# Patient Record
Sex: FEMALE | Race: WHITE | NOT HISPANIC OR LATINO | Employment: UNEMPLOYED | URBAN - NONMETROPOLITAN AREA
[De-identification: names, ages, dates, MRNs, and addresses within clinical notes are randomized per-mention and may not be internally consistent; named-entity substitution may affect disease eponyms.]

---
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
## 2024-01-21 ENCOUNTER — HOSPITAL ENCOUNTER (EMERGENCY)
Facility: HOSPITAL | Age: 19
Discharge: HOME | End: 2024-01-21
Attending: EMERGENCY MEDICINE | Admitting: EMERGENCY MEDICINE
Payer: COMMERCIAL

## 2024-01-21 VITALS
DIASTOLIC BLOOD PRESSURE: 81 MMHG | HEIGHT: 68 IN | OXYGEN SATURATION: 97 % | SYSTOLIC BLOOD PRESSURE: 139 MMHG | HEART RATE: 145 BPM | TEMPERATURE: 96.9 F | WEIGHT: 200.29 LBS | RESPIRATION RATE: 16 BRPM | BODY MASS INDEX: 30.36 KG/M2

## 2024-01-21 DIAGNOSIS — N39.0 URINARY TRACT INFECTION ASSOCIATED WITH CATHETERIZATION OF URINARY TRACT, UNSPECIFIED INDWELLING URINARY CATHETER TYPE, INITIAL ENCOUNTER (H): ICD-10-CM

## 2024-01-21 DIAGNOSIS — T83.511A URINARY TRACT INFECTION ASSOCIATED WITH CATHETERIZATION OF URINARY TRACT, UNSPECIFIED INDWELLING URINARY CATHETER TYPE, INITIAL ENCOUNTER (H): ICD-10-CM

## 2024-01-21 LAB
ALBUMIN UR-MCNC: NEGATIVE MG/DL
APPEARANCE UR: CLEAR
BACTERIA #/AREA URNS HPF: ABNORMAL /HPF
BILIRUB UR QL STRIP: NEGATIVE
COLOR UR AUTO: ABNORMAL
GLUCOSE UR STRIP-MCNC: NEGATIVE MG/DL
HGB UR QL STRIP: NEGATIVE
KETONES UR STRIP-MCNC: NEGATIVE MG/DL
LEUKOCYTE ESTERASE UR QL STRIP: NEGATIVE
NITRATE UR QL: NEGATIVE
PH UR STRIP: 6 [PH] (ref 4.7–8)
RBC URINE: 0 /HPF
SP GR UR STRIP: 1 (ref 1–1.03)
SQUAMOUS EPITHELIAL: 0 /HPF
UROBILINOGEN UR STRIP-MCNC: NORMAL MG/DL
WBC URINE: 1 /HPF

## 2024-01-21 PROCEDURE — 99283 EMERGENCY DEPT VISIT LOW MDM: CPT

## 2024-01-21 PROCEDURE — 81001 URINALYSIS AUTO W/SCOPE: CPT | Performed by: EMERGENCY MEDICINE

## 2024-01-21 PROCEDURE — 87086 URINE CULTURE/COLONY COUNT: CPT | Performed by: EMERGENCY MEDICINE

## 2024-01-21 PROCEDURE — 99283 EMERGENCY DEPT VISIT LOW MDM: CPT | Performed by: EMERGENCY MEDICINE

## 2024-01-21 RX ORDER — CEFDINIR 300 MG/1
300 CAPSULE ORAL 2 TIMES DAILY
Qty: 14 CAPSULE | Refills: 0 | Status: SHIPPED | OUTPATIENT
Start: 2024-01-21 | End: 2024-01-28

## 2024-01-21 NOTE — ED TRIAGE NOTES
Reports to having panic attack\, hx of anxiety. Reports she gets them often.  Reports left flank pain onset last evening. Unknown of fever, no bloody urine but reports its cloudy, denies dysuria.   Reports she is constipated, last BM was a few days ago, per pt not un normal to be constipated since she reports hx of it.

## 2024-01-21 NOTE — ED PROVIDER NOTES
History     Chief Complaint   Patient presents with    Anxiety    Flank Pain           Constipation    UTI     HPI  Rylee J Huttel is a 18 year old female who presents with concern for UTI.  She has dysuria of the left leg, history thereof, no other associated symptoms.  No severe pain, duration ongoing.  Character persistent.        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 See scanned visit from [facility] - 2015.  CTSS services, therapy in school      Bilateral vesicoureteral reflux 2011     Priority: Medium     Formatting of this note might be different from the original. [facility] IMPRESSION: 1. Grade IV left vesicoureteral reflux. 2. Grade III right vesicoureteral reflux. 3. Moderate post void residual in the bladder. IMO Update 10/11      Urinary incontinence 2010     Priority: Medium     Formatting of this note might be different from the original. Urology visit 16 - behavioral modifications, habit toileting.  If no improvement in 2 months - mom is to contact urology and will consider oxybutynin.  Also with constipation - recommended miralax cleanout, then maintainence      Exotropia 2009     Priority: Medium     Formatting of this note might be different from the original. IMO Update      Astigmatism 2009     Priority: Medium     Formatting of this note might be different from the original. IMO Update      Myopia 2009     Priority: Medium        Past Medical History:    No past medical history on file.    Past Surgical History:    No past surgical history on file.    Family History:    No family history on file.    Social  "History:  Marital Status:  Single [1]  Social History     Tobacco Use    Smoking status: Former     Types: Cigarettes    Smokeless tobacco: Never   Substance Use Topics    Alcohol use: Not Currently    Drug use: Not Currently        Medications:    cefdinir (OMNICEF) 300 MG capsule  cephALEXin (KEFLEX) 500 MG capsule  hydrOXYzine (ATARAX) 25 MG tablet  polyethylene glycol (MIRALAX) 17 GM/Dose powder  senna (SENOKOT) 8.6 MG tablet          Review of Systems  Respiratory denies.  Cardiovascular denies.  GI denies.   Per HPI.    Physical Exam   BP: 139/81  Pulse: (!) 145  Temp: 96.9  F (36.1  C)  Resp: 16  Height: 172.7 cm (5' 8\")  Weight: 90.9 kg (200 lb 4.6 oz)  SpO2: 97 %      Physical Exam  HENT:      Head: Normocephalic and atraumatic.      Nose: Nose normal.      Mouth/Throat:      Mouth: Mucous membranes are moist.   Eyes:      Conjunctiva/sclera: Conjunctivae normal.      Pupils: Pupils are equal, round, and reactive to light.   Cardiovascular:      Rate and Rhythm: Normal rate.      Pulses: Normal pulses.   Pulmonary:      Effort: Respiratory distress present.   Abdominal:      General: There is no distension.      Palpations: Abdomen is soft.      Tenderness: There is no abdominal tenderness.   Musculoskeletal:         General: Normal range of motion.      Cervical back: Normal range of motion.   Skin:     General: Skin is warm and dry.      Capillary Refill: Capillary refill takes less than 2 seconds.   Neurological:      General: No focal deficit present.      Mental Status: She is alert and oriented to person, place, and time.   Psychiatric:         Mood and Affect: Mood normal.              Results for orders placed or performed during the hospital encounter of 01/21/24 (from the past 24 hour(s))   UA with Microscopic reflex to Culture    Specimen: Urine, Midstream   Result Value Ref Range    Color Urine Straw Colorless, Straw, Light Yellow, Yellow    Appearance Urine Clear Clear    Glucose Urine " Negative Negative mg/dL    Bilirubin Urine Negative Negative    Ketones Urine Negative Negative mg/dL    Specific Gravity Urine 1.005 1.003 - 1.035    Blood Urine Negative Negative    pH Urine 6.0 4.7 - 8.0    Protein Albumin Urine Negative Negative mg/dL    Urobilinogen Urine Normal Normal, 2.0 mg/dL    Nitrite Urine Negative Negative    Leukocyte Esterase Urine Negative Negative    Bacteria Urine Few (A) None Seen /HPF    RBC Urine 0 <=2 /HPF    WBC Urine 1 <=5 /HPF    Squamous Epithelials Urine 0 <=1 /HPF    Narrative    Urine Culture not indicated       Medications - No data to display    Assessments & Plan (with Medical Decision Making)   89-year-old female with dysuria and left flank discomfort with mild nausea, urinalysis perhaps suggestive of UTI, she believes she has a UTI, plan for treatment for pyelonephritis as the patient know she has a history thereof.  Omnicef initiated.  She is absolute, she is not pregnant and family declines pregnancy test.  Abdominal examination benign and not warranting CT imaging.  No blood in urine and clinically no suggestion of renal colic.      Discharge Medication List as of 1/21/2024  1:47 PM        START taking these medications    Details   cefdinir (OMNICEF) 300 MG capsule Take 1 capsule (300 mg) by mouth 2 times daily for 7 days, Disp-14 capsule, R-0, E-Prescribe             Final diagnoses:   Urinary tract infection associated with catheterization of urinary tract, unspecified indwelling urinary catheter type, initial encounter  (H24)       1/21/2024   [facility]       Gilbert Hansen MD  01/21/24 8891

## 2024-01-22 LAB — BACTERIA UR CULT: NORMAL

## 2024-03-18 ENCOUNTER — APPOINTMENT (OUTPATIENT)
Dept: ULTRASOUND IMAGING | Facility: HOSPITAL | Age: 19
End: 2024-03-18
Attending: NURSE PRACTITIONER
Payer: MEDICAID

## 2024-03-18 ENCOUNTER — HOSPITAL ENCOUNTER (EMERGENCY)
Facility: HOSPITAL | Age: 19
Discharge: HOME | End: 2024-03-18
Attending: NURSE PRACTITIONER | Admitting: NURSE PRACTITIONER
Payer: MEDICAID

## 2024-03-18 VITALS
OXYGEN SATURATION: 98 % | TEMPERATURE: 97.2 F | BODY MASS INDEX: 27.22 KG/M2 | SYSTOLIC BLOOD PRESSURE: 130 MMHG | HEART RATE: 97 BPM | RESPIRATION RATE: 18 BRPM | WEIGHT: 179.6 LBS | DIASTOLIC BLOOD PRESSURE: 56 MMHG | HEIGHT: 68 IN

## 2024-03-18 DIAGNOSIS — R10.13 EPIGASTRIC ABDOMINAL PAIN: ICD-10-CM

## 2024-03-18 DIAGNOSIS — R10.13 DYSPEPSIA: ICD-10-CM

## 2024-03-18 LAB
ALBUMIN SERPL BCG-MCNC: 4.5 G/DL (ref 3.5–5.2)
ALBUMIN UR-MCNC: NEGATIVE MG/DL
ALP SERPL-CCNC: 65 U/L (ref 40–150)
ALT SERPL W P-5'-P-CCNC: 22 U/L (ref 0–50)
ANION GAP SERPL CALCULATED.3IONS-SCNC: 12 MMOL/L (ref 7–15)
APPEARANCE UR: CLEAR
AST SERPL W P-5'-P-CCNC: 38 U/L (ref 0–35)
BASOPHILS # BLD AUTO: 0 10E3/UL (ref 0–0.2)
BASOPHILS NFR BLD AUTO: 0 %
BILIRUB SERPL-MCNC: 0.6 MG/DL
BILIRUB UR QL STRIP: NEGATIVE
BUN SERPL-MCNC: 7 MG/DL (ref 6–20)
CALCIUM SERPL-MCNC: 9.5 MG/DL (ref 8.6–10)
CHLORIDE SERPL-SCNC: 104 MMOL/L (ref 98–107)
COLOR UR AUTO: ABNORMAL
CREAT SERPL-MCNC: 0.75 MG/DL (ref 0.51–0.95)
DEPRECATED HCO3 PLAS-SCNC: 23 MMOL/L (ref 22–29)
EGFRCR SERPLBLD CKD-EPI 2021: >90 ML/MIN/1.73M2
EOSINOPHIL # BLD AUTO: 0 10E3/UL (ref 0–0.7)
EOSINOPHIL NFR BLD AUTO: 0 %
ERYTHROCYTE [DISTWIDTH] IN BLOOD BY AUTOMATED COUNT: 14.8 % (ref 10–15)
GLUCOSE SERPL-MCNC: 91 MG/DL (ref 70–99)
GLUCOSE UR STRIP-MCNC: NEGATIVE MG/DL
HCG UR QL: NEGATIVE
HCT VFR BLD AUTO: 39.9 % (ref 35–47)
HGB BLD-MCNC: 13.3 G/DL (ref 11.7–15.7)
HGB UR QL STRIP: NEGATIVE
HOLD SPECIMEN: NORMAL
IMM GRANULOCYTES # BLD: 0 10E3/UL
IMM GRANULOCYTES NFR BLD: 0 %
KETONES UR STRIP-MCNC: ABNORMAL MG/DL
LEUKOCYTE ESTERASE UR QL STRIP: ABNORMAL
LIPASE SERPL-CCNC: 14 U/L (ref 13–60)
LYMPHOCYTES # BLD AUTO: 1.4 10E3/UL (ref 0.8–5.3)
LYMPHOCYTES NFR BLD AUTO: 21 %
MCH RBC QN AUTO: 28.1 PG (ref 26.5–33)
MCHC RBC AUTO-ENTMCNC: 33.3 G/DL (ref 31.5–36.5)
MCV RBC AUTO: 84 FL (ref 78–100)
MONOCYTES # BLD AUTO: 0.6 10E3/UL (ref 0–1.3)
MONOCYTES NFR BLD AUTO: 8 %
MUCOUS THREADS #/AREA URNS LPF: PRESENT /LPF
NEUTROPHILS # BLD AUTO: 4.9 10E3/UL (ref 1.6–8.3)
NEUTROPHILS NFR BLD AUTO: 70 %
NITRATE UR QL: NEGATIVE
NRBC # BLD AUTO: 0 10E3/UL
NRBC BLD AUTO-RTO: 0 /100
PH UR STRIP: 6 [PH] (ref 4.7–8)
PLATELET # BLD AUTO: 127 10E3/UL (ref 150–450)
POTASSIUM SERPL-SCNC: 3.2 MMOL/L (ref 3.4–5.3)
PROT SERPL-MCNC: 7.7 G/DL (ref 6.4–8.3)
RBC # BLD AUTO: 4.74 10E6/UL (ref 3.8–5.2)
RBC URINE: 1 /HPF
SODIUM SERPL-SCNC: 139 MMOL/L (ref 135–145)
SP GR UR STRIP: 1.01 (ref 1–1.03)
SQUAMOUS EPITHELIAL: 5 /HPF
UROBILINOGEN UR STRIP-MCNC: NORMAL MG/DL
WBC # BLD AUTO: 6.9 10E3/UL (ref 4–11)
WBC URINE: 2 /HPF

## 2024-03-18 PROCEDURE — 81001 URINALYSIS AUTO W/SCOPE: CPT | Performed by: FAMILY MEDICINE

## 2024-03-18 PROCEDURE — 83690 ASSAY OF LIPASE: CPT | Performed by: NURSE PRACTITIONER

## 2024-03-18 PROCEDURE — 250N000013 HC RX MED GY IP 250 OP 250 PS 637: Performed by: NURSE PRACTITIONER

## 2024-03-18 PROCEDURE — 81025 URINE PREGNANCY TEST: CPT | Performed by: NURSE PRACTITIONER

## 2024-03-18 PROCEDURE — 99283 EMERGENCY DEPT VISIT LOW MDM: CPT | Performed by: NURSE PRACTITIONER

## 2024-03-18 PROCEDURE — 99284 EMERGENCY DEPT VISIT MOD MDM: CPT | Mod: 25 | Performed by: NURSE PRACTITIONER

## 2024-03-18 PROCEDURE — 250N000009 HC RX 250: Performed by: NURSE PRACTITIONER

## 2024-03-18 PROCEDURE — 80053 COMPREHEN METABOLIC PANEL: CPT | Performed by: NURSE PRACTITIONER

## 2024-03-18 PROCEDURE — 81001 URINALYSIS AUTO W/SCOPE: CPT | Performed by: NURSE PRACTITIONER

## 2024-03-18 PROCEDURE — 87086 URINE CULTURE/COLONY COUNT: CPT | Performed by: NURSE PRACTITIONER

## 2024-03-18 PROCEDURE — 85025 COMPLETE CBC W/AUTO DIFF WBC: CPT | Performed by: NURSE PRACTITIONER

## 2024-03-18 PROCEDURE — 36415 COLL VENOUS BLD VENIPUNCTURE: CPT | Performed by: NURSE PRACTITIONER

## 2024-03-18 RX ORDER — LIDOCAINE HYDROCHLORIDE 20 MG/ML
10 SOLUTION OROPHARYNGEAL ONCE
Status: COMPLETED | OUTPATIENT
Start: 2024-03-18 | End: 2024-03-18

## 2024-03-18 RX ORDER — MAGNESIUM HYDROXIDE/ALUMINUM HYDROXICE/SIMETHICONE 120; 1200; 1200 MG/30ML; MG/30ML; MG/30ML
15 SUSPENSION ORAL ONCE
Status: COMPLETED | OUTPATIENT
Start: 2024-03-18 | End: 2024-03-18

## 2024-03-18 RX ADMIN — ALUMINUM HYDROXIDE, MAGNESIUM HYDROXIDE, AND DIMETHICONE 15 ML: 200; 20; 200 SUSPENSION ORAL at 19:41

## 2024-03-18 RX ADMIN — LIDOCAINE HYDROCHLORIDE 10 ML: 20 SOLUTION ORAL at 19:41

## 2024-03-18 ASSESSMENT — ENCOUNTER SYMPTOMS
MUSCULOSKELETAL NEGATIVE: 1
BLOOD IN STOOL: 1
ABDOMINAL PAIN: 1
PSYCHIATRIC NEGATIVE: 1
HEMATOLOGIC/LYMPHATIC NEGATIVE: 1
NEUROLOGICAL NEGATIVE: 1
VOMITING: 0
ALLERGIC/IMMUNOLOGIC NEGATIVE: 1
ENDOCRINE NEGATIVE: 1
FLANK PAIN: 0
CONSTIPATION: 1
CONSTITUTIONAL NEGATIVE: 1
NAUSEA: 0
DYSURIA: 1
CARDIOVASCULAR NEGATIVE: 1
RESPIRATORY NEGATIVE: 1
EYES NEGATIVE: 1
HEMATURIA: 0

## 2024-03-18 ASSESSMENT — ACTIVITIES OF DAILY LIVING (ADL)
ADLS_ACUITY_SCORE: 35
ADLS_ACUITY_SCORE: 35

## 2024-03-18 ASSESSMENT — COLUMBIA-SUICIDE SEVERITY RATING SCALE - C-SSRS
2. HAVE YOU ACTUALLY HAD ANY THOUGHTS OF KILLING YOURSELF IN THE PAST MONTH?: NO
1. IN THE PAST MONTH, HAVE YOU WISHED YOU WERE DEAD OR WISHED YOU COULD GO TO SLEEP AND NOT WAKE UP?: NO

## 2024-03-18 NOTE — ED TRIAGE NOTES
Patient presents with c/o stomach and back pain. Patient reports upper medial stomach pain which causes her back to start hurting and has been consistent over the last 24 hours. Patient reports last BM a couple days ago, reports she has to use laxatives to have a BM, reports not passing gas, reports dysuria, and bright red blood in stool.

## 2024-03-19 NOTE — ED PROVIDER NOTES
History     Chief Complaint   Patient presents with    Abdominal Pain     HPI  Rylee J Huttel is a 18 year old individual with history of adjustment disorder, comes in for abdominal pain.  Patient states that she developed epigastric pain last night and continues to have it today.  For this reason comes in.  No fever or chills.  No obvious nausea or vomiting.  States does have some burning with urination but no hematuria.  States had BM 2 days ago and did have some streaks of blood in it.  States does have chronic constipation.  Patient states there is no vaginal bleeding or discharge.  Last menstrual period ended the end of 2024.    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 See scanned visit from [facility] - 2015.  [facility] services, therapy in school      Bilateral vesicoureteral reflux 2011     Priority: Medium     Formatting of this note might be different from the original. [facility] IMPRESSION: 1. Grade IV left vesicoureteral reflux. 2. Grade III right vesicoureteral reflux. 3. Moderate post void residual in the bladder. IMO Update 10/11      Urinary incontinence 2010     Priority: Medium     Formatting of this note might be different from the original. Urology visit 16 - behavioral modifications, habit toileting.  If no improvement in 2 months - mom is to contact urology and will consider oxybutynin.  Also with constipation - recommended miralax cleanout, then maintainence      Exotropia 2009     Priority: Medium     Formatting of this note might be different from the original. IMO Update      Astigmatism 2009     Priority: Medium  "    Formatting of this note might be different from the original. IMO Update      Myopia 04/28/2009     Priority: Medium        Past Medical History:    History reviewed. No pertinent past medical history.    Past Surgical History:    History reviewed. No pertinent surgical history.    Family History:    History reviewed. No pertinent family history.    Social History:  Marital Status:  Single [1]  Social History     Tobacco Use    Smoking status: Former     Types: Cigarettes    Smokeless tobacco: Never   Substance Use Topics    Alcohol use: Not Currently    Drug use: Not Currently        Medications:    cephALEXin (KEFLEX) 500 MG capsule  hydrOXYzine (ATARAX) 25 MG tablet  polyethylene glycol (MIRALAX) 17 GM/Dose powder  senna (SENOKOT) 8.6 MG tablet          Review of Systems   Constitutional: Negative.    HENT: Negative.     Eyes: Negative.    Respiratory: Negative.     Cardiovascular: Negative.    Gastrointestinal:  Positive for abdominal pain (Epigastric), blood in stool and constipation. Negative for nausea and vomiting.   Endocrine: Negative.    Genitourinary:  Positive for dysuria. Negative for flank pain, hematuria, pelvic pain, vaginal bleeding and vaginal discharge.   Musculoskeletal: Negative.    Skin: Negative.    Allergic/Immunologic: Negative.    Neurological: Negative.    Hematological: Negative.    Psychiatric/Behavioral: Negative.         Physical Exam   BP: 138/83  Pulse: 90  Temp: 97.2  F (36.2  C)  Resp: 18  Height: 172.7 cm (5' 8\")  Weight: 81.5 kg (179 lb 9.6 oz)  SpO2: 99 %      GENERAL APPEARANCE:  The patient is a 18 year old well-developed, well-nourished individual in no acute distress that appears as stated age.  LUNGS:  Breathing is easy.  Breath sounds are equal and clear bilaterally.  No wheezes, rhonchi, or rales.  HEART:  Regular rate and rhythm with normal S1 and S2.  No murmurs, gallops, or rubs.  ABDOMEN:  Soft.  No mass, guarding, or rebound.  Right upper quadrant tenderness to " palpation.  Negative Rovsing.  No organomegaly or hernia.  Bowel sounds are present.  No CVA tenderness or flank mass.  No abdominal bruits or thrills present upon auscultation/palpation.  GENITOURINARY: No obvious anterior pelvic tenderness, hernia, mass noted to palpation.  NEUROLOGIC:  No focal sensory or motor deficits are noted.    PSYCHIATRIC:  The patient is awake, alert, and oriented x4.  Recent and remote memory is intact.  Extremely anxious mood and affect.  Cooperative with history and physical exam.  SKIN:  Warm, dry, and well perfused.  Good turgor.  No lesions, nodules, or rashes are noted.  No bruising noted.      Comment: Discrepancies between my note and notes on behalf of the nursing team or other care providers are secondary to my findings reflecting my physical examination and questioning of the patient.  Any conflicting information provided is not in line with my examination of the patient.       ED Course     ED Course as of 03/18/24 2001   Mon Mar 18, 2024   1907 In to see patient and history/physical completed.    1907 UA with Microscopic reflex to Culture(!)  UA does have moderate leukocyte esterase but only 2 WBCs.  Does have 5 squamous epithelial cells making it likely contaminated.   1934 POCUS gallbladder ultrasound was conducted showing no obvious abnormality   1934 GI cocktail ordered due to epigastric pain.   1954 Patient had relief of symptoms after GI cocktail.  Patient extremely anxious.  Likely is having dyspepsia.  All other lab work is benign so we will discharge home.  No antibiotics will await urine culture.  OTC antacids recommended.  Follow-up with PCP.     POC US ABDOMEN LIMITED    Date/Time: 3/18/2024 7:34 PM    Performed by: Gilbert Live APRN CNP  Authorized by: Gilbert Live APRN CNP    Procedure details:     Indications: abdominal pain      Assessment for:  Gallstones    Hepatobiliary:  Visualized  Hepatobiliary findings:     Common bile duct:  Normal     Gallbladder wall:  Normal    Gallbladder stones: not identified      Intra-abdominal fluid: not identified      Sonographic Noble's sign: negative           Results for orders placed or performed during the hospital encounter of 03/18/24 (from the past 24 hour(s))   UA with Microscopic reflex to Culture    Specimen: Urine, Clean Catch   Result Value Ref Range    Color Urine Light Yellow Colorless, Straw, Light Yellow, Yellow    Appearance Urine Clear Clear    Glucose Urine Negative Negative mg/dL    Bilirubin Urine Negative Negative    Ketones Urine Trace (A) Negative mg/dL    Specific Gravity Urine 1.015 1.003 - 1.035    Blood Urine Negative Negative    pH Urine 6.0 4.7 - 8.0    Protein Albumin Urine Negative Negative mg/dL    Urobilinogen Urine Normal Normal, 2.0 mg/dL    Nitrite Urine Negative Negative    Leukocyte Esterase Urine Moderate (A) Negative    Mucus Urine Present (A) None Seen /LPF    RBC Urine 1 <=2 /HPF    WBC Urine 2 <=5 /HPF    Squamous Epithelials Urine 5 (H) <=1 /HPF    Narrative    Urine Culture ordered based on laboratory criteria   HCG qualitative urine   Result Value Ref Range    hCG Urine Qualitative Negative Negative   [facility] Draw    Narrative    The following orders were created for panel order [facility] Draw.  Procedure                               Abnormality         Status                     ---------                               -----------         ------                     Extra Blue Top Tube[[id number]]                              In process                 Extra Red Top Tube[[id number]]                               In process                 Extra Green Top (Lithium...[[id number]]                      In process                 Extra Purple Top Tube[[id number]]                            In process                 Extra Heparinized Syringe[[id number]]                        In process                   Please view results for these tests on the individual orders.   CBC with  platelets differential    Narrative    The following orders were created for panel order CBC with platelets differential.  Procedure                               Abnormality         Status                     ---------                               -----------         ------                     CBC with platelets and d...[[id number]]  Abnormal            Final result               RBC and Platelet Morphology[[id number]]                                                   Please view results for these tests on the individual orders.   Comprehensive metabolic panel   Result Value Ref Range    Sodium 139 135 - 145 mmol/L    Potassium 3.2 (L) 3.4 - 5.3 mmol/L    Carbon Dioxide (CO2) 23 22 - 29 mmol/L    Anion Gap 12 7 - 15 mmol/L    Urea Nitrogen 7.0 6.0 - 20.0 mg/dL    Creatinine 0.75 0.51 - 0.95 mg/dL    GFR Estimate >90 >60 mL/min/1.73m2    Calcium 9.5 8.6 - 10.0 mg/dL    Chloride 104 98 - 107 mmol/L    Glucose 91 70 - 99 mg/dL    Alkaline Phosphatase 65 40 - 150 U/L    AST 38 (H) 0 - 35 U/L    ALT 22 0 - 50 U/L    Protein Total 7.7 6.4 - 8.3 g/dL    Albumin 4.5 3.5 - 5.2 g/dL    Bilirubin Total 0.6 <=1.2 mg/dL   Lipase   Result Value Ref Range    Lipase 14 13 - 60 U/L   CBC with platelets and differential   Result Value Ref Range    WBC Count 6.9 4.0 - 11.0 10e3/uL    RBC Count 4.74 3.80 - 5.20 10e6/uL    Hemoglobin 13.3 11.7 - 15.7 g/dL    Hematocrit 39.9 35.0 - 47.0 %    MCV 84 78 - 100 fL    MCH 28.1 26.5 - 33.0 pg    MCHC 33.3 31.5 - 36.5 g/dL    RDW 14.8 10.0 - 15.0 %    Platelet Count 127 (L) 150 - 450 10e3/uL    % Neutrophils 70 %    % Lymphocytes 21 %    % Monocytes 8 %    % Eosinophils 0 %    % Basophils 0 %    % Immature Granulocytes 0 %    NRBCs per 100 WBC 0 <1 /100    Absolute Neutrophils 4.9 1.6 - 8.3 10e3/uL    Absolute Lymphocytes 1.4 0.8 - 5.3 10e3/uL    Absolute Monocytes 0.6 0.0 - 1.3 10e3/uL    Absolute Eosinophils 0.0 0.0 - 0.7 10e3/uL    Absolute Basophils 0.0 0.0 - 0.2 10e3/uL    Absolute  Immature Granulocytes 0.0 <=0.4 10e3/uL    Absolute NRBCs 0.0 10e3/uL       Medications   alum & mag hydroxide-simethicone (MAALOX) suspension 15 mL (15 mLs Oral $Given 3/18/24 1941)   lidocaine (viscous) (XYLOCAINE) 2 % solution 10 mL (10 mLs Mouth/Throat $Given 3/18/24 1941)       Assessments & Plan (with Medical Decision Making)     I have reviewed the nursing notes.    I have reviewed the findings, diagnosis, plan and need for follow up with the patient.      Summary:  Patient presents to the ER today for abdominal pain.  Potential diagnosis which have been considered and evaluated include GERD, cholecystitis, pancreatitis, appendicitis, as well as others. Many of these have been excluded using the various modalities and assessment as noted on the chart. At the present time, the diagnosis given seems to be the most likely epigastric pain likely from dyspepsia.  Upon arrival, vitals signs are normal.  The patient is alert but extremely anxious upon arrival.  Cardiac and respiratory examination normal.  Minimal right upper quadrant tenderness to palpation.  No hernia or mass.  No CVA tenderness.  POCUS gallbladder ultrasound was conducted showing no obvious abnormalities.  Lab work obtained showing WBC of 6.9 with hemoglobin 13.3.  Sodium 139 with potassium 3.2.  Renal and hepatic functions otherwise benign.  Lipase normal at 14.  Pregnancy negative.  Does have moderate leukocyte esterase in the urine but 5 squamous epithelial cells with only 2 WBC's.  GI cocktail given with resolution of symptoms.  Likely is having dyspepsia.  Lab work is otherwise benign at this time.  UA likely contaminated.  No antibiotics until urine culture returns.  Will discharge patient home to do OTC heart medication such as Tums or famotidine.  Good hydration therapy education given.  Follow-up with PCP for reevaluation.  Return if worsening.  Patient verbalized understand this plan of care.  Patient discharged home.        Critical  Care Time: None    Impression and plan discussed with patient. Questions answered, concerns addressed, indications for urgent re-evaluation reviewed, and  given. Patient/Parent/Caregiver agree with treatment plan and have no further questions at this time.  AVS provided at discharge.    This note was created by the Dragon Voice Dictation System. Inadvertent typographical errors, due to software recognition problems, may still exist.             New Prescriptions    No medications on file       Final diagnoses:   Epigastric abdominal pain   Dyspepsia       3/18/2024   [facility]       Gilbert Live APRN CNP  03/18/24 2001

## 2024-03-19 NOTE — ED NOTES
Was diagnosed with a UTI about a month ago but did not  prescription d/t financial reasons.  Here with ABD and and flank pain. Has lost more than 50 lbs recently intentionally.  Did make a statement about eating too much food causing an anxiety attack.  Provider made aware.

## 2024-03-19 NOTE — DISCHARGE INSTRUCTIONS
Use over-the-counter meds such as Tums, famotidine for epigastric pain.    Keep well-hydrated.       Follow-up with your primary care provider for reevaluation.  Contact your primary care provider if you have any questions or concerns.  Do not hesitate to return to the ER if any new or worsening symptoms.     Please read the attached instructions (if any).  They highlight more specific treatments and interventions for you at home.              Thank you for letting me participate in your care and wish you a fast and uneventful recovery,    Gilbert MADRIGAL, CNP    Do not hesitate to contact me with questions or concerns.  [email or web address]  [email or web address]

## 2024-03-20 LAB — BACTERIA UR CULT: NORMAL

## 2024-03-24 ENCOUNTER — APPOINTMENT (OUTPATIENT)
Dept: CT IMAGING | Facility: HOSPITAL | Age: 19
End: 2024-03-24
Attending: STUDENT IN AN ORGANIZED HEALTH CARE EDUCATION/TRAINING PROGRAM
Payer: MEDICAID

## 2024-03-24 ENCOUNTER — HOSPITAL ENCOUNTER (EMERGENCY)
Facility: HOSPITAL | Age: 19
Discharge: LEFT BEFORE DISCHARGE | End: 2024-03-25
Attending: STUDENT IN AN ORGANIZED HEALTH CARE EDUCATION/TRAINING PROGRAM | Admitting: STUDENT IN AN ORGANIZED HEALTH CARE EDUCATION/TRAINING PROGRAM
Payer: MEDICAID

## 2024-03-24 DIAGNOSIS — K83.8 COMMON BILE DUCT DILATATION: ICD-10-CM

## 2024-03-24 LAB
ALBUMIN SERPL BCG-MCNC: 4.1 G/DL (ref 3.5–5.2)
ALBUMIN UR-MCNC: 10 MG/DL
ALP SERPL-CCNC: 78 U/L (ref 40–150)
ALT SERPL W P-5'-P-CCNC: 67 U/L (ref 0–50)
ANION GAP SERPL CALCULATED.3IONS-SCNC: 14 MMOL/L (ref 7–15)
APPEARANCE UR: CLEAR
AST SERPL W P-5'-P-CCNC: 89 U/L (ref 0–35)
BILIRUB DIRECT SERPL-MCNC: 0.62 MG/DL (ref 0–0.3)
BILIRUB SERPL-MCNC: 0.9 MG/DL
BILIRUB UR QL STRIP: NEGATIVE
BUN SERPL-MCNC: 9.1 MG/DL (ref 6–20)
CALCIUM SERPL-MCNC: 9.3 MG/DL (ref 8.6–10)
CHLORIDE SERPL-SCNC: 103 MMOL/L (ref 98–107)
COLOR UR AUTO: YELLOW
CREAT SERPL-MCNC: 0.81 MG/DL (ref 0.51–0.95)
DEPRECATED HCO3 PLAS-SCNC: 22 MMOL/L (ref 22–29)
EGFRCR SERPLBLD CKD-EPI 2021: >90 ML/MIN/1.73M2
ERYTHROCYTE [DISTWIDTH] IN BLOOD BY AUTOMATED COUNT: 14.2 % (ref 10–15)
GLUCOSE SERPL-MCNC: 157 MG/DL (ref 70–99)
GLUCOSE UR STRIP-MCNC: NEGATIVE MG/DL
HCG INTACT+B SERPL-ACNC: <1 MIU/ML
HCT VFR BLD AUTO: 36.2 % (ref 35–47)
HGB BLD-MCNC: 12 G/DL (ref 11.7–15.7)
HGB UR QL STRIP: NEGATIVE
KETONES UR STRIP-MCNC: ABNORMAL MG/DL
LEUKOCYTE ESTERASE UR QL STRIP: NEGATIVE
LIPASE SERPL-CCNC: 13 U/L (ref 13–60)
MCH RBC QN AUTO: 28.2 PG (ref 26.5–33)
MCHC RBC AUTO-ENTMCNC: 33.1 G/DL (ref 31.5–36.5)
MCV RBC AUTO: 85 FL (ref 78–100)
MUCOUS THREADS #/AREA URNS LPF: PRESENT /LPF
NITRATE UR QL: NEGATIVE
PH UR STRIP: 6 [PH] (ref 4.7–8)
PLATELET # BLD AUTO: 166 10E3/UL (ref 150–450)
POTASSIUM SERPL-SCNC: 3.3 MMOL/L (ref 3.4–5.3)
PROT SERPL-MCNC: 6.9 G/DL (ref 6.4–8.3)
RBC # BLD AUTO: 4.26 10E6/UL (ref 3.8–5.2)
RBC URINE: 0 /HPF
SODIUM SERPL-SCNC: 139 MMOL/L (ref 135–145)
SP GR UR STRIP: 1.01 (ref 1–1.03)
SQUAMOUS EPITHELIAL: 2 /HPF
TROPONIN T SERPL HS-MCNC: <6 NG/L
UROBILINOGEN UR STRIP-MCNC: 4 MG/DL
WBC # BLD AUTO: 9.5 10E3/UL (ref 4–11)
WBC URINE: 1 /HPF

## 2024-03-24 PROCEDURE — 84702 CHORIONIC GONADOTROPIN TEST: CPT | Performed by: STUDENT IN AN ORGANIZED HEALTH CARE EDUCATION/TRAINING PROGRAM

## 2024-03-24 PROCEDURE — 250N000011 HC RX IP 250 OP 636: Performed by: STUDENT IN AN ORGANIZED HEALTH CARE EDUCATION/TRAINING PROGRAM

## 2024-03-24 PROCEDURE — 81001 URINALYSIS AUTO W/SCOPE: CPT | Performed by: STUDENT IN AN ORGANIZED HEALTH CARE EDUCATION/TRAINING PROGRAM

## 2024-03-24 PROCEDURE — 96361 HYDRATE IV INFUSION ADD-ON: CPT

## 2024-03-24 PROCEDURE — 96374 THER/PROPH/DIAG INJ IV PUSH: CPT | Mod: XU

## 2024-03-24 PROCEDURE — 99285 EMERGENCY DEPT VISIT HI MDM: CPT | Mod: 25

## 2024-03-24 PROCEDURE — 93005 ELECTROCARDIOGRAM TRACING: CPT | Mod: RTG

## 2024-03-24 PROCEDURE — 258N000003 HC RX IP 258 OP 636: Performed by: STUDENT IN AN ORGANIZED HEALTH CARE EDUCATION/TRAINING PROGRAM

## 2024-03-24 PROCEDURE — 93010 ELECTROCARDIOGRAM REPORT: CPT | Performed by: INTERNAL MEDICINE

## 2024-03-24 PROCEDURE — 85027 COMPLETE CBC AUTOMATED: CPT | Performed by: STUDENT IN AN ORGANIZED HEALTH CARE EDUCATION/TRAINING PROGRAM

## 2024-03-24 PROCEDURE — 36415 COLL VENOUS BLD VENIPUNCTURE: CPT | Performed by: STUDENT IN AN ORGANIZED HEALTH CARE EDUCATION/TRAINING PROGRAM

## 2024-03-24 PROCEDURE — 80048 BASIC METABOLIC PNL TOTAL CA: CPT | Performed by: STUDENT IN AN ORGANIZED HEALTH CARE EDUCATION/TRAINING PROGRAM

## 2024-03-24 PROCEDURE — 83690 ASSAY OF LIPASE: CPT | Performed by: STUDENT IN AN ORGANIZED HEALTH CARE EDUCATION/TRAINING PROGRAM

## 2024-03-24 PROCEDURE — 84484 ASSAY OF TROPONIN QUANT: CPT | Performed by: STUDENT IN AN ORGANIZED HEALTH CARE EDUCATION/TRAINING PROGRAM

## 2024-03-24 PROCEDURE — 99285 EMERGENCY DEPT VISIT HI MDM: CPT | Performed by: STUDENT IN AN ORGANIZED HEALTH CARE EDUCATION/TRAINING PROGRAM

## 2024-03-24 PROCEDURE — 74177 CT ABD & PELVIS W/CONTRAST: CPT

## 2024-03-24 PROCEDURE — 80053 COMPREHEN METABOLIC PANEL: CPT | Performed by: STUDENT IN AN ORGANIZED HEALTH CARE EDUCATION/TRAINING PROGRAM

## 2024-03-24 RX ORDER — IOPAMIDOL 755 MG/ML
88 INJECTION, SOLUTION INTRAVASCULAR ONCE
Status: COMPLETED | OUTPATIENT
Start: 2024-03-24 | End: 2024-03-24

## 2024-03-24 RX ORDER — HYDROMORPHONE HYDROCHLORIDE 1 MG/ML
0.5 INJECTION, SOLUTION INTRAMUSCULAR; INTRAVENOUS; SUBCUTANEOUS EVERY 30 MIN PRN
Status: DISCONTINUED | OUTPATIENT
Start: 2024-03-24 | End: 2024-03-25 | Stop reason: HOSPADM

## 2024-03-24 RX ADMIN — IOPAMIDOL 88 ML: 755 INJECTION, SOLUTION INTRAVENOUS at 22:39

## 2024-03-24 RX ADMIN — SODIUM CHLORIDE 1000 ML: 9 INJECTION, SOLUTION INTRAVENOUS at 21:42

## 2024-03-24 RX ADMIN — HYDROMORPHONE HYDROCHLORIDE 0.5 MG: 1 INJECTION, SOLUTION INTRAMUSCULAR; INTRAVENOUS; SUBCUTANEOUS at 21:57

## 2024-03-24 ASSESSMENT — ACTIVITIES OF DAILY LIVING (ADL)
ADLS_ACUITY_SCORE: 35
ADLS_ACUITY_SCORE: 35

## 2024-03-24 ASSESSMENT — COLUMBIA-SUICIDE SEVERITY RATING SCALE - C-SSRS
2. HAVE YOU ACTUALLY HAD ANY THOUGHTS OF KILLING YOURSELF IN THE PAST MONTH?: NO
1. IN THE PAST MONTH, HAVE YOU WISHED YOU WERE DEAD OR WISHED YOU COULD GO TO SLEEP AND NOT WAKE UP?: NO
6. HAVE YOU EVER DONE ANYTHING, STARTED TO DO ANYTHING, OR PREPARED TO DO ANYTHING TO END YOUR LIFE?: NO

## 2024-03-25 VITALS
DIASTOLIC BLOOD PRESSURE: 87 MMHG | SYSTOLIC BLOOD PRESSURE: 141 MMHG | OXYGEN SATURATION: 100 % | HEART RATE: 115 BPM | TEMPERATURE: 98.7 F | RESPIRATION RATE: 12 BRPM

## 2024-03-25 LAB
ATRIAL RATE - MUSE: 48 BPM
DIASTOLIC BLOOD PRESSURE - MUSE: NORMAL MMHG
INTERPRETATION ECG - MUSE: NORMAL
P AXIS - MUSE: 49 DEGREES
PR INTERVAL - MUSE: 150 MS
QRS DURATION - MUSE: 90 MS
QT - MUSE: 492 MS
QTC - MUSE: 439 MS
R AXIS - MUSE: 82 DEGREES
SYSTOLIC BLOOD PRESSURE - MUSE: NORMAL MMHG
T AXIS - MUSE: 54 DEGREES
VENTRICULAR RATE- MUSE: 48 BPM

## 2024-03-25 RX ORDER — LORAZEPAM 2 MG/ML
1 INJECTION INTRAMUSCULAR ONCE
Status: DISCONTINUED | OUTPATIENT
Start: 2024-03-25 | End: 2024-03-25 | Stop reason: HOSPADM

## 2024-03-25 ASSESSMENT — ACTIVITIES OF DAILY LIVING (ADL)
ADLS_ACUITY_SCORE: 35

## 2024-03-25 NOTE — ED NOTES
EMS would be able to pick patient up in twenty minutes.  Updated patient and then she declined EMS ride again.  Canceled the EMS ride and she wants to sign AMA forms.  Cousin calling [employer] to pick them up. Provider made aware.

## 2024-03-25 NOTE — ED TRIAGE NOTES
"Patient ambulatory to ED room 2. Patient reports that she has had abdominal pain x2 months. Patient points to epigastrium. Patient states she was seen a couple days ago and was told she had heartburn, patient states \"this doesn't feel like heartburn\". Patient reports that she had a BM today and noted some blood \"when wiping\". Patient's coloring is pale.         "

## 2024-03-25 NOTE — ED PROVIDER NOTES
History     Chief Complaint   Patient presents with    Abdominal Pain     X2 months, worse today     HPI  Rylee J Huttel is a 18 year old female who presents with epigastric abdominal pain. She has had this intermittently for several months, but most severe started today. Epigastric in nature and radiates to chest and back. No hx of abdominal surgery. She has been told maybe it is reflux but feels much different than reflux. No current daily medications. Does have hydroxyzine as needed for anxiety. No N/V. Did have a bowel movement earlier today. Possibly saw a spot of blood with this. Period finished approximately 2-3 weeks ago. No fevers. No other acute concerns. No ETOH and no marijuana use.    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 See scanned visit from [facility] - 2015.  [facility]SS services, therapy in school      Bilateral vesicoureteral reflux 2011     Priority: Medium     Formatting of this note might be different from the original. [facility] IMPRESSION: 1. Grade IV left vesicoureteral reflux. 2. Grade III right vesicoureteral reflux. 3. Moderate post void residual in the bladder. IMO Update 10/11      Urinary incontinence 2010     Priority: Medium     Formatting of this note might be different from the original. Urology visit 16 - behavioral modifications, habit toileting.  If no improvement in 2 months - mom is to contact urology and will consider oxybutynin.  Also with constipation - recommended miralax cleanout, then maintainence      Exotropia 2009     Priority: Medium     Formatting of this note might be different from the original. IMO  Update      Astigmatism 04/28/2009     Priority: Medium     Formatting of this note might be different from the original. IMO Update      Myopia 04/28/2009     Priority: Medium        Past Medical History:    No past medical history on file.    Past Surgical History:    No past surgical history on file.    Family History:    No family history on file.    Social History:  Marital Status:  Single [1]  Social History     Tobacco Use    Smoking status: Former     Types: Cigarettes    Smokeless tobacco: Never   Substance Use Topics    Alcohol use: Not Currently    Drug use: Not Currently        Medications:    cephALEXin (KEFLEX) 500 MG capsule  hydrOXYzine (ATARAX) 25 MG tablet  polyethylene glycol (MIRALAX) 17 GM/Dose powder  senna (SENOKOT) 8.6 MG tablet      Review of Systems   All other systems reviewed and are negative.    Physical Exam   BP: (!) 83/54  Pulse: 55  Temp: (!) 96.2  F (35.7  C)  Resp: 16  SpO2: 99 %      Physical Exam  Vitals and nursing note reviewed.   Constitutional:       General: She is in acute distress.      Appearance: She is well-developed. She is not ill-appearing.   HENT:      Head: Normocephalic.   Eyes:      Extraocular Movements: Extraocular movements intact.   Cardiovascular:      Rate and Rhythm: Normal rate and regular rhythm.      Heart sounds: Normal heart sounds. No murmur heard.     No friction rub.   Pulmonary:      Effort: Pulmonary effort is normal.      Breath sounds: Normal breath sounds.   Abdominal:      General: Abdomen is flat. Bowel sounds are increased.      Palpations: Abdomen is rigid.      Tenderness: There is abdominal tenderness in the epigastric area. There is no guarding or rebound. Negative signs include Noble's sign.      Hernia: No hernia is present.   Skin:     General: Skin is warm and dry.      Capillary Refill: Capillary refill takes less than 2 seconds.   Neurological:      General: No focal deficit present.      Mental Status: She is alert.    Psychiatric:         Mood and Affect: Mood normal.         ED Course     ED Course as of 03/25/24 1911   Mon Mar 25, 2024   0002 Work-up shows biliary ductal dilitation and periportal edema. Given her epigastric/RUQ abdominal pain this could be consistent with choledocholithiais. We do not have MRCP or US or ERCP to evaluate at this time so will plan on transfer. Dr. Jesus at [place] accepts.     0659 Sign out given pending transfer to [place] for possible biliary obstruction needing MRCP/ERCP.   0701 SOR 18F with epigastric pain, periportale edema and biliary ductal distention. Pending transfer to [facility] transfer. Needs MRCP.   0733 Noted to speak with the patient.  She is sleeping in the room waiting for her ride.  I chose not to wake her at this time   1201 I did speak with the patient.  She again noted that she had no abdominal pain.  I expressed the importance of continuing to follow-up with the plan.  She was concerned that she might have to go to [place] alone that she is never been anywhere alone.  I offered to address her anxiety.  She agreed to take some Ativan prior to transfer and her mother lives in [place] so we agreed that her mother could come and visit her when she gets there.  Then while I was seeing other patients she decided that she was going to leave.  She left [place] before I had any opportunity to speak with her.     Procedures              Results for orders placed or performed during the hospital encounter of 03/24/24 (from the past 24 hour(s))   CBC with platelets   Result Value Ref Range    WBC Count 9.5 4.0 - 11.0 10e3/uL    RBC Count 4.26 3.80 - 5.20 10e6/uL    Hemoglobin 12.0 11.7 - 15.7 g/dL    Hematocrit 36.2 35.0 - 47.0 %    MCV 85 78 - 100 fL    MCH 28.2 26.5 - 33.0 pg    MCHC 33.1 31.5 - 36.5 g/dL    RDW 14.2 10.0 - 15.0 %    Platelet Count 166 150 - 450 10e3/uL   Basic metabolic panel   Result Value Ref Range    Sodium 139 135 - 145 mmol/L    Potassium 3.3 (L) 3.4 - 5.3  mmol/L    Chloride 103 98 - 107 mmol/L    Carbon Dioxide (CO2) 22 22 - 29 mmol/L    Anion Gap 14 7 - 15 mmol/L    Urea Nitrogen 9.1 6.0 - 20.0 mg/dL    Creatinine 0.81 0.51 - 0.95 mg/dL    GFR Estimate >90 >60 mL/min/1.73m2    Calcium 9.3 8.6 - 10.0 mg/dL    Glucose 157 (H) 70 - 99 mg/dL   Hepatic panel   Result Value Ref Range    Protein Total 6.9 6.4 - 8.3 g/dL    Albumin 4.1 3.5 - 5.2 g/dL    Bilirubin Total 0.9 <=1.2 mg/dL    Alkaline Phosphatase 78 40 - 150 U/L    AST 89 (H) 0 - 35 U/L    ALT 67 (H) 0 - 50 U/L    Bilirubin Direct 0.62 (H) 0.00 - 0.30 mg/dL   Lipase   Result Value Ref Range    Lipase 13 13 - 60 U/L   Troponin T, High Sensitivity   Result Value Ref Range    Troponin T, High Sensitivity <6 <=14 ng/L   HCG quantitative pregnancy (blood)   Result Value Ref Range    hCG Quantitative <1 <5 mIU/mL   EKG 12 lead   Result Value Ref Range    Systolic Blood Pressure  mmHg    Diastolic Blood Pressure  mmHg    Ventricular Rate 48 BPM    Atrial Rate 48 BPM    VA Interval 150 ms    QRS Duration 90 ms     ms    QTc 439 ms    P Axis 49 degrees    R AXIS 82 degrees    T Axis 54 degrees    Interpretation ECG       Sinus bradycardia  Otherwise normal ECG  When compared with ECG of 09-NOV-2023 13:27,  Vent. rate has decreased BY  75 BPM  T wave inversion no longer evident in Inferior leads  Nonspecific T wave abnormality now evident in Anterior leads  Confirmed by MD Jewell, [facility] Sandra ([id number]) on 3/25/2024 6:46:31 AM     CT Abdomen Pelvis w Contrast    Narrative    CT ABDOMEN PELVIS W CONTRAST    CLINICAL HISTORY: Female, age 18 years, Epigastric abdominal pain;    Comparison:  No relevant prior imaging.    TECHNIQUE:  CT scanwas performed of the abdomen and pelvis with IV  contrast. Axial; sagittal and coronal images were reviewed. If  present, MIP and/or 3-D images were constructed by the technologist.    FINDINGS:  Chest:   The lung bases and visualized portions of the heart  unremarkable.    Abdomen/Pelvis:  Stomach and duodenum: Unremarkable    Liver: Intrahepatic and axial hepatic biliary dilatation, periportal  edema. No focal lesion. There is slight decrease in density along the  falciform ligament.    Gallbladder: Unremarkable.    Spleen: Unremarkable.    Pancreas: Unremarkable.    Adrenal glands: Unremarkable.    Kidneys: Small cortical cyst and focus of cortical scarring in the  upper pole the left kidney.    Ureters: Unremarkable.    Bladder: Nondistended.    Large and small bowel: Moderate volume of stool in the distal and  proximal colon. No acute abnormality.    Appendix: Unremarkable.    Small volume of free fluid is seen within the lower pelvis.  Normal-appearing follicles are seen upon the left and right ovary.     Bony structures: Unremarkable.      Impression    IMPRESSION:   Intrahepatic and axial hepatic biliary dilatation without evidence of  a radiodense obstructing stone or apparent mass. Consider MRCP versus  ERCP to evaluate for an obstructive process.    Perceived wall thickening of the urinary bladder, at least partially  related to incomplete distention. Cannot completely exclude cystitis.  Small volume of free fluid in the lower pelvis suggesting recent  rupture of an ovarian follicle.     This report is in agreement with the preliminary report.      This facility minimizes radiation dose by adjusting the mA and/or kV  according to each patient size.      This CT scan was performed using one or more the following dose  reduction techniques:    -Automated exposure control,  -Adjustment of the mA and/or kV according to patient's size, and/or,  -Use of iterative reconstruction technique.    LINSEY BLUE MD         SYSTEM ID:  [id number]   UA with Microscopic reflex to Culture    Specimen: Urine, Midstream   Result Value Ref Range    Color Urine Yellow Colorless, Straw, Light Yellow, Yellow    Appearance Urine Clear Clear    Glucose Urine Negative Negative  mg/dL    Bilirubin Urine Negative Negative    Ketones Urine Trace (A) Negative mg/dL    Specific Gravity Urine 1.015 1.003 - 1.035    Blood Urine Negative Negative    pH Urine 6.0 4.7 - 8.0    Protein Albumin Urine 10 (A) Negative mg/dL    Urobilinogen Urine 4.0 (A) Normal, 2.0 mg/dL    Nitrite Urine Negative Negative    Leukocyte Esterase Urine Negative Negative    Mucus Urine Present (A) None Seen /LPF    RBC Urine 0 <=2 /HPF    WBC Urine 1 <=5 /HPF    Squamous Epithelials Urine 2 (H) <=1 /HPF    Narrative    Urine Culture not indicated       Medications   sodium chloride 0.9% BOLUS 1,000 mL (0 mLs Intravenous Stopped 3/24/24 2313)   iopamidol (ISOVUE-370) solution 88 mL (88 mLs Intravenous $Given 3/24/24 2239)   sodium chloride (PF) 0.9% PF flush 50 mL (50 mLs Intravenous $Given 3/24/24 2240)       Assessments & Plan (with Medical Decision Making)     I have reviewed the nursing notes.    Epigastric pain. Although she does have chronic abdominal pain, she appears uncomfortable and states this is different/worse. Plan on labs, pain control, CT scan. Do not suspect gynecologic etiology given no lower abdominal pain. No vaginal bleeding. Disposition pending work-up and pain control. Low suspicion for AAA given her age and risk factors.    See ED Coruse.    I have reviewed the findings, diagnosis, plan and need for follow up with the patient.      Discharge Medication List as of 3/25/2024  9:54 AM          Final diagnoses:   Common bile duct dilatation       3/24/2024   [facility]       Med Blanc MD  03/25/24 1911

## 2024-03-25 NOTE — ED NOTES
Called [facility] at [phone] to let them know the patient would no longer be transferred to them and they did not need to hold room 1412 anymore.    Partial Purse String (Simple) Text: Given the location of the defect and the characteristics of the surrounding skin a simple purse string closure was deemed most appropriate.  Undermining was performed circumfirentially around the surgical defect.  A purse string suture was then placed and tightened. Wound tension only allowed a partial closure of the circular defect.

## 2024-05-07 ENCOUNTER — HOSPITAL ENCOUNTER (EMERGENCY)
Facility: HOSPITAL | Age: 19
Discharge: LEFT BEFORE DISCHARGE | End: 2024-05-07
Payer: MEDICAID

## 2024-05-07 VITALS
DIASTOLIC BLOOD PRESSURE: 72 MMHG | RESPIRATION RATE: 18 BRPM | OXYGEN SATURATION: 100 % | WEIGHT: 167.77 LBS | BODY MASS INDEX: 25.51 KG/M2 | TEMPERATURE: 98 F | HEART RATE: 91 BPM | SYSTOLIC BLOOD PRESSURE: 108 MMHG

## 2024-05-08 ENCOUNTER — TELEPHONE (OUTPATIENT)
Dept: EMERGENCY MEDICINE | Facility: HOSPITAL | Age: 19
End: 2024-05-08

## 2024-05-08 NOTE — ED TRIAGE NOTES
States that about 1 hour ago developed upper abdominal pain. Ate about 1/2 hour before pain started. States was here about a month ago for similar pain and was suppose to go to [place], but refused because grandmother couldn't ride along in ambulance. Denies N/V. Last bowel movement was toady. Has not taken anything for pain.      Triage Assessment (Adult)       Row Name 05/07/24 2000          Triage Assessment    Airway WDL WDL

## 2024-05-08 NOTE — ED NOTES
Care Transitions focused note:      Follow up call on patient who left without being seen.    Pt came in with abd pain.  States she is feeling better today.  States she does not haver PCP.  Would like [place] [facility] care appt .      Appt with Dr Cory Davis on Wed June 5th at 9:15 am.      Called patient with this appt time.  Encouraged to return if symptoms return or worsen.    LIDIA Causey

## 2024-05-20 ENCOUNTER — APPOINTMENT (OUTPATIENT)
Dept: ULTRASOUND IMAGING | Facility: HOSPITAL | Age: 19
End: 2024-05-20
Attending: PHYSICIAN ASSISTANT
Payer: MEDICAID

## 2024-05-20 ENCOUNTER — HOSPITAL ENCOUNTER (EMERGENCY)
Facility: HOSPITAL | Age: 19
Discharge: HOME | End: 2024-05-20
Attending: PHYSICIAN ASSISTANT | Admitting: PHYSICIAN ASSISTANT
Payer: MEDICAID

## 2024-05-20 VITALS
OXYGEN SATURATION: 99 % | SYSTOLIC BLOOD PRESSURE: 135 MMHG | DIASTOLIC BLOOD PRESSURE: 69 MMHG | HEART RATE: 85 BPM | RESPIRATION RATE: 16 BRPM | TEMPERATURE: 98.1 F

## 2024-05-20 DIAGNOSIS — K80.20 SYMPTOMATIC CHOLELITHIASIS: Primary | ICD-10-CM

## 2024-05-20 LAB
ALBUMIN SERPL BCG-MCNC: 4.4 G/DL (ref 3.5–5.2)
ALBUMIN UR-MCNC: NEGATIVE MG/DL
ALP SERPL-CCNC: 68 U/L (ref 40–150)
ALT SERPL W P-5'-P-CCNC: 14 U/L (ref 0–50)
ANION GAP SERPL CALCULATED.3IONS-SCNC: 10 MMOL/L (ref 7–15)
APPEARANCE UR: CLEAR
AST SERPL W P-5'-P-CCNC: 16 U/L (ref 0–35)
BASOPHILS # BLD AUTO: 0 10E3/UL (ref 0–0.2)
BASOPHILS NFR BLD AUTO: 0 %
BILIRUB SERPL-MCNC: 0.4 MG/DL
BILIRUB UR QL STRIP: NEGATIVE
BUN SERPL-MCNC: 7 MG/DL (ref 6–20)
CALCIUM SERPL-MCNC: 9.5 MG/DL (ref 8.6–10)
CHLORIDE SERPL-SCNC: 105 MMOL/L (ref 98–107)
COLOR UR AUTO: ABNORMAL
CREAT SERPL-MCNC: 0.79 MG/DL (ref 0.51–0.95)
DEPRECATED HCO3 PLAS-SCNC: 24 MMOL/L (ref 22–29)
EGFRCR SERPLBLD CKD-EPI 2021: >90 ML/MIN/1.73M2
EOSINOPHIL # BLD AUTO: 0 10E3/UL (ref 0–0.7)
EOSINOPHIL NFR BLD AUTO: 0 %
ERYTHROCYTE [DISTWIDTH] IN BLOOD BY AUTOMATED COUNT: 13.5 % (ref 10–15)
GLUCOSE SERPL-MCNC: 99 MG/DL (ref 70–99)
GLUCOSE UR STRIP-MCNC: NEGATIVE MG/DL
HCG UR QL: NEGATIVE
HCT VFR BLD AUTO: 38.5 % (ref 35–47)
HGB BLD-MCNC: 12.8 G/DL (ref 11.7–15.7)
HGB UR QL STRIP: NEGATIVE
HOLD SPECIMEN: NORMAL
IMM GRANULOCYTES # BLD: 0 10E3/UL
IMM GRANULOCYTES NFR BLD: 0 %
KETONES UR STRIP-MCNC: NEGATIVE MG/DL
LEUKOCYTE ESTERASE UR QL STRIP: ABNORMAL
LIPASE SERPL-CCNC: 11 U/L (ref 13–60)
LYMPHOCYTES # BLD AUTO: 1.7 10E3/UL (ref 0.8–5.3)
LYMPHOCYTES NFR BLD AUTO: 26 %
MCH RBC QN AUTO: 28.4 PG (ref 26.5–33)
MCHC RBC AUTO-ENTMCNC: 33.2 G/DL (ref 31.5–36.5)
MCV RBC AUTO: 86 FL (ref 78–100)
MONOCYTES # BLD AUTO: 0.4 10E3/UL (ref 0–1.3)
MONOCYTES NFR BLD AUTO: 6 %
MUCOUS THREADS #/AREA URNS LPF: PRESENT /LPF
NEUTROPHILS # BLD AUTO: 4.3 10E3/UL (ref 1.6–8.3)
NEUTROPHILS NFR BLD AUTO: 68 %
NITRATE UR QL: NEGATIVE
NRBC # BLD AUTO: 0 10E3/UL
NRBC BLD AUTO-RTO: 0 /100
PH UR STRIP: 6.5 [PH] (ref 4.7–8)
PLATELET # BLD AUTO: 165 10E3/UL (ref 150–450)
POTASSIUM SERPL-SCNC: 3.7 MMOL/L (ref 3.4–5.3)
PROT SERPL-MCNC: 7.4 G/DL (ref 6.4–8.3)
RBC # BLD AUTO: 4.5 10E6/UL (ref 3.8–5.2)
RBC URINE: 1 /HPF
SODIUM SERPL-SCNC: 139 MMOL/L (ref 135–145)
SP GR UR STRIP: 1.01 (ref 1–1.03)
SQUAMOUS EPITHELIAL: 2 /HPF
UROBILINOGEN UR STRIP-MCNC: NORMAL MG/DL
WBC # BLD AUTO: 6.3 10E3/UL (ref 4–11)
WBC URINE: 2 /HPF

## 2024-05-20 PROCEDURE — 83690 ASSAY OF LIPASE: CPT | Performed by: PHYSICIAN ASSISTANT

## 2024-05-20 PROCEDURE — 81001 URINALYSIS AUTO W/SCOPE: CPT | Performed by: PHYSICIAN ASSISTANT

## 2024-05-20 PROCEDURE — 85025 COMPLETE CBC W/AUTO DIFF WBC: CPT | Performed by: PHYSICIAN ASSISTANT

## 2024-05-20 PROCEDURE — 36415 COLL VENOUS BLD VENIPUNCTURE: CPT | Performed by: PHYSICIAN ASSISTANT

## 2024-05-20 PROCEDURE — 99284 EMERGENCY DEPT VISIT MOD MDM: CPT | Mod: 25

## 2024-05-20 PROCEDURE — 76705 ECHO EXAM OF ABDOMEN: CPT

## 2024-05-20 PROCEDURE — 81025 URINE PREGNANCY TEST: CPT | Performed by: PHYSICIAN ASSISTANT

## 2024-05-20 PROCEDURE — 99284 EMERGENCY DEPT VISIT MOD MDM: CPT | Performed by: PHYSICIAN ASSISTANT

## 2024-05-20 PROCEDURE — 81001 URINALYSIS AUTO W/SCOPE: CPT | Performed by: STUDENT IN AN ORGANIZED HEALTH CARE EDUCATION/TRAINING PROGRAM

## 2024-05-20 PROCEDURE — 81025 URINE PREGNANCY TEST: CPT | Performed by: STUDENT IN AN ORGANIZED HEALTH CARE EDUCATION/TRAINING PROGRAM

## 2024-05-20 PROCEDURE — 82040 ASSAY OF SERUM ALBUMIN: CPT | Performed by: PHYSICIAN ASSISTANT

## 2024-05-20 ASSESSMENT — COLUMBIA-SUICIDE SEVERITY RATING SCALE - C-SSRS
6. HAVE YOU EVER DONE ANYTHING, STARTED TO DO ANYTHING, OR PREPARED TO DO ANYTHING TO END YOUR LIFE?: NO
2. HAVE YOU ACTUALLY HAD ANY THOUGHTS OF KILLING YOURSELF IN THE PAST MONTH?: NO
1. IN THE PAST MONTH, HAVE YOU WISHED YOU WERE DEAD OR WISHED YOU COULD GO TO SLEEP AND NOT WAKE UP?: NO

## 2024-05-20 ASSESSMENT — ACTIVITIES OF DAILY LIVING (ADL)
ADLS_ACUITY_SCORE: 33
ADLS_ACUITY_SCORE: 35

## 2024-05-20 ASSESSMENT — ENCOUNTER SYMPTOMS
FEVER: 0
ROS GI COMMENTS: SEE HPI

## 2024-05-20 NOTE — ED TRIAGE NOTES
Patient presents with c/o intermittent epigastric abdominal pain for a couple months. Patient reports that she has been evaluated for this pain in the past, she thinks it may be something with her gallbladder. Patient also reports feeling weak and pain is radiating from medial epigastric pain into left side under ribs. Denies taking anything for the pain, denies any fevers or chills. Denies any active epigastric pain at this time.

## 2024-05-20 NOTE — ED PROVIDER NOTES
History     Chief Complaint   Patient presents with    Abdominal Pain     The history is provided by the patient.     Rylee J Huttel is a 18 year old female who presented to the emergency department ambulatory for evaluation of persistent but intermittent epigastric pain.  Seen in March for similar and found to have mildly abnormal transaminases and a dilated biliary duct.  She elected to sign out AGAINST MEDICAL ADVICE after being offered transfer for evaluation.  Worsening with daily.     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 See scanned visit from [facility] - 2015.  [facility] services, therapy in school      Bilateral vesicoureteral reflux 2011     Priority: Medium     Formatting of this note might be different from the original. [facility] IMPRESSION: 1. Grade IV left vesicoureteral reflux. 2. Grade III right vesicoureteral reflux. 3. Moderate post void residual in the bladder. IMO Update 10/11      Urinary incontinence 2010     Priority: Medium     Formatting of this note might be different from the original. Urology visit 16 - behavioral modifications, habit toileting.  If no improvement in 2 months - mom is to contact urology and will consider oxybutynin.  Also with constipation - recommended miralax cleanout, then maintainence      Exotropia 2009     Priority: Medium     Formatting of this note might be different from the original. IMO Update      Astigmatism 2009     Priority: Medium     Formatting of this note might be different from the original. IMO Update      Myopia 2009     Priority: Medium        Past Medical History:     History reviewed. No pertinent past medical history.    Past Surgical History:    History reviewed. No pertinent surgical history.    Family History:    History reviewed. No pertinent family history.    Social History:  Marital Status:  Single [1]  Social History     Tobacco Use    Smoking status: Former     Types: Cigarettes    Smokeless tobacco: Never   Substance Use Topics    Alcohol use: Not Currently    Drug use: Not Currently        Medications:    cephALEXin (KEFLEX) 500 MG capsule  hydrOXYzine (ATARAX) 25 MG tablet  polyethylene glycol (MIRALAX) 17 GM/Dose powder  senna (SENOKOT) 8.6 MG tablet          Review of Systems   Constitutional:  Negative for fever.   Gastrointestinal:         See HPI       Physical Exam   BP: 125/82  Pulse: 83  Temp: 98  F (36.7  C)  Resp: 16  SpO2: 100 %      Physical Exam  Vitals and nursing note reviewed.   Constitutional:       General: She is not in acute distress.     Appearance: Normal appearance. She is normal weight. She is not ill-appearing, toxic-appearing or diaphoretic.   Cardiovascular:      Rate and Rhythm: Normal rate and regular rhythm.   Pulmonary:      Effort: Pulmonary effort is normal.   Abdominal:      General: There is no distension.      Palpations: Abdomen is soft.      Tenderness: There is no abdominal tenderness.   Skin:     General: Skin is warm and dry.      Capillary Refill: Capillary refill takes less than 2 seconds.   Neurological:      General: No focal deficit present.      Mental Status: She is alert and oriented to person, place, and time.         ED Course     ED Course as of 05/20/24 1945   Mon May 20, 2024   1856 Bilirubin Total: 0.4   1856 AST: 16   1856 ALT: 14   1856 Lipase(!): 11     Procedures              Critical Care time:  none               Results for orders placed or performed during the hospital encounter of 05/20/24 (from the past 24 hour(s))   UA with Microscopic reflex to Culture    Specimen: Urine, Clean Catch   Result Value  Ref Range    Color Urine Straw Colorless, Straw, Light Yellow, Yellow    Appearance Urine Clear Clear    Glucose Urine Negative Negative mg/dL    Bilirubin Urine Negative Negative    Ketones Urine Negative Negative mg/dL    Specific Gravity Urine 1.007 1.003 - 1.035    Blood Urine Negative Negative    pH Urine 6.5 4.7 - 8.0    Protein Albumin Urine Negative Negative mg/dL    Urobilinogen Urine Normal Normal, 2.0 mg/dL    Nitrite Urine Negative Negative    Leukocyte Esterase Urine Small (A) Negative    Mucus Urine Present (A) None Seen /LPF    RBC Urine 1 <=2 /HPF    WBC Urine 2 <=5 /HPF    Squamous Epithelials Urine 2 (H) <=1 /HPF    Narrative    Urine Culture not indicated   HCG qualitative urine   Result Value Ref Range    hCG Urine Qualitative Negative Negative   CBC with platelets differential    Narrative    The following orders were created for panel order CBC with platelets differential.  Procedure                               Abnormality         Status                     ---------                               -----------         ------                     CBC with platelets and d...[[id number]]                      Final result                 Please view results for these tests on the individual orders.   Comprehensive metabolic panel   Result Value Ref Range    Sodium 139 135 - 145 mmol/L    Potassium 3.7 3.4 - 5.3 mmol/L    Carbon Dioxide (CO2) 24 22 - 29 mmol/L    Anion Gap 10 7 - 15 mmol/L    Urea Nitrogen 7.0 6.0 - 20.0 mg/dL    Creatinine 0.79 0.51 - 0.95 mg/dL    GFR Estimate >90 >60 mL/min/1.73m2    Calcium 9.5 8.6 - 10.0 mg/dL    Chloride 105 98 - 107 mmol/L    Glucose 99 70 - 99 mg/dL    Alkaline Phosphatase 68 40 - 150 U/L    AST 16 0 - 35 U/L    ALT 14 0 - 50 U/L    Protein Total 7.4 6.4 - 8.3 g/dL    Albumin 4.4 3.5 - 5.2 g/dL    Bilirubin Total 0.4 <=1.2 mg/dL   Lipase   Result Value Ref Range    Lipase 11 (L) 13 - 60 U/L   Extra Tube    Narrative    The following orders were created for  panel order Extra Tube.  Procedure                               Abnormality         Status                     ---------                               -----------         ------                     Extra Blue Top Tube[[id number]]                              Final result               Extra Red Top Tube[[id number]]                               Final result               Extra Heparinized Syringe[[id number]]                        Final result                 Please view results for these tests on the individual orders.   Extra Blue Top Tube   Result Value Ref Range    Hold Specimen JIC    Extra Red Top Tube   Result Value Ref Range    Hold Specimen JIC    Extra Heparinized Syringe   Result Value Ref Range    Hold Specimen JIC    CBC with platelets and differential   Result Value Ref Range    WBC Count 6.3 4.0 - 11.0 10e3/uL    RBC Count 4.50 3.80 - 5.20 10e6/uL    Hemoglobin 12.8 11.7 - 15.7 g/dL    Hematocrit 38.5 35.0 - 47.0 %    MCV 86 78 - 100 fL    MCH 28.4 26.5 - 33.0 pg    MCHC 33.2 31.5 - 36.5 g/dL    RDW 13.5 10.0 - 15.0 %    Platelet Count 165 150 - 450 10e3/uL    % Neutrophils 68 %    % Lymphocytes 26 %    % Monocytes 6 %    % Eosinophils 0 %    % Basophils 0 %    % Immature Granulocytes 0 %    NRBCs per 100 WBC 0 <1 /100    Absolute Neutrophils 4.3 1.6 - 8.3 10e3/uL    Absolute Lymphocytes 1.7 0.8 - 5.3 10e3/uL    Absolute Monocytes 0.4 0.0 - 1.3 10e3/uL    Absolute Eosinophils 0.0 0.0 - 0.7 10e3/uL    Absolute Basophils 0.0 0.0 - 0.2 10e3/uL    Absolute Immature Granulocytes 0.0 <=0.4 10e3/uL    Absolute NRBCs 0.0 10e3/uL   US Abdomen Limited    Narrative    EXAMINATION: Limited Abdominal Ultrasound, 5/20/2024 7:15 PM     COMPARISON: None.    HISTORY: The epigastric and upper quadrant pain    TECHNIQUE: Focused right upper quadrant multiplanar scan of the  abdomen using both gray-scale, color Doppler ultrasound techniques.    FINDINGS:   Fluid: No evidence of ascites or pleural effusions.    Liver: The  liver is normal in size and echotexture. There is no focal  mass. The main portal vein is nondilated and demonstrates appropriate  flow towards the liver.    Gallbladder: There is no wall thickening, pericholecystic fluid,  positive sonographic Noble's sign. Echogenic shadowing stones near  the neck.    Bile Ducts: Both the intra- and extrahepatic biliary system are of  normal caliber.  The common bile duct measures 5 mm in diameter.    Pancreas: Visualized portions of the head and body of the pancreas are  unremarkable.     Right kidney: The right kidney measures 10.1 cm long. There is no  hydronephrosis or hydroureter, no shadowing renal calculi or soft  tissue mass.     Vessels: The visualized IVC and aorta are within normal limits      Impression    IMPRESSION:   1.  No acute right upper quadrant abnormality.  2.  Cholelithiasis without cholecystitis.    RIZWANA BILLS MD         SYSTEM ID:  [id number]       Medications - No data to display    Assessments & Plan (with Medical Decision Making)   18-year-old female with intermittent epigastric pain worse with some foods.  Her recent emergency department evaluation in March showed mild elevations of transaminases as well as bilirubin.  Mild dilation of the CBD.  She unfortunately like to sign out AGAINST MEDICAL ADVICE before definitive therapy.  Symptoms are most consistent biliary colic.  She has no high risk or red flag features.  She has no vomiting or fevers.  She has no active discomfort.  Ultrasound of the right upper quadrant shows cholelithiasis with normal CBD and no evidence of acute cholecystitis.  She has normal transaminases and bilirubin today.  General surgery referral was placed.  Strict return precautions were provided.  Low-fat diet discussed.  She has an establish care visit in June.    Ms. Rivero has also agreed to schedule a follow up appointment with her primary provider clinic and general surgery for re-evaluation and further management. She  verbalized an understanding of the results of our workup and agree with the complete discharge plan including instructions for return and follow up.  There is no indication for further investigation or treatment on an emergent basis.  There is no reasonably foreseeable injury that would be associated with discharge and close outpatient follow-up.      This document was prepared using a combination of typing and voice generated software.  While every attempt was made for accuracy, spelling and grammatical errors may exist.     I have reviewed the nursing notes.    I have reviewed the findings, diagnosis, plan and need for follow up with the patient.           Medical Decision Making  The patient's presentation was of moderate complexity (an undiagnosed new problem with uncertain diagnosis).    The patient's evaluation involved:  ordering and/or review of 3+ test(s) in this encounter (multiple labs and ultrasound) review of previous emergency department labs    The patient's management necessitated only low risk treatment.        New Prescriptions    No medications on file       Final diagnoses:   Symptomatic cholelithiasis       5/20/2024   [facility]       Dee Grewal PA-C  05/20/24 1950

## 2024-05-20 NOTE — ED NOTES
Pt has lower abdominal pain that radiates to left upper quadrant under rib. Pt states this has been intermittent for the last couple months. Pt denies nausea,vomiting, & SOB. Pt does state she is constipated but this is normal for her. Pt states her last BM was today and was small. Pt states she doesn't drink enough fluids. Pt is also concerned about various black and blue marks on bilateral lower legs that she doesn't know what are from. She doesn't recall injuring them. She states they are painful if touched.

## 2024-05-21 NOTE — DISCHARGE INSTRUCTIONS
Your abdominal pain is likely secondary to gallstones.  I have placed a general surgery referral to discuss possible surgical treatment.  Continue a low-fat diet.  Ibuprofen and Tylenol as needed.  Return here for any other questions or concerns.    Please make sure you follow-up with Dr. Davis as scheduled on June 5.

## 2024-05-29 ENCOUNTER — PREP FOR PROCEDURE (OUTPATIENT)
Dept: SURGERY | Facility: OTHER | Age: 19
End: 2024-05-29

## 2024-05-29 ENCOUNTER — OFFICE VISIT (OUTPATIENT)
Dept: SURGERY | Facility: OTHER | Age: 19
End: 2024-05-29
Attending: SURGERY
Payer: MEDICAID

## 2024-05-29 VITALS
BODY MASS INDEX: 25.58 KG/M2 | DIASTOLIC BLOOD PRESSURE: 66 MMHG | TEMPERATURE: 97.3 F | HEIGHT: 67 IN | HEART RATE: 111 BPM | WEIGHT: 163 LBS | SYSTOLIC BLOOD PRESSURE: 128 MMHG | OXYGEN SATURATION: 100 % | RESPIRATION RATE: 14 BRPM

## 2024-05-29 DIAGNOSIS — K83.8 DILATED BILE DUCT: ICD-10-CM

## 2024-05-29 DIAGNOSIS — K80.20 SYMPTOMATIC CHOLELITHIASIS: Primary | ICD-10-CM

## 2024-05-29 DIAGNOSIS — K80.20 CHOLELITHIASES: Primary | ICD-10-CM

## 2024-05-29 PROCEDURE — 99204 OFFICE O/P NEW MOD 45 MIN: CPT | Performed by: SURGERY

## 2024-05-29 PROCEDURE — G0463 HOSPITAL OUTPT CLINIC VISIT: HCPCS | Performed by: SURGERY

## 2024-05-29 ASSESSMENT — PAIN SCALES - GENERAL: PAINLEVEL: NO PAIN (0)

## 2024-05-29 NOTE — PROGRESS NOTES
"CLINIC NOTE - CONSULT  5/29/2024    Patient : Rylee J Huttel  Referring Physician : Dee Grewal    Reason for Referral : Symptomoatic Cholelithiasis    This is a 18 year old female who is presented to the emergency room several times with right upper quadrant pain.  She has had both a CT and an ultrasound which showed cholelithiasis without evidence of acute cholecystitis.  Of note on her CT she did have dilated intra and extrahepatic ducts.  No history of jaundice.  No history of pancreatitis.  Seem to be associated with fatty foods.      Past Medical History:  No past medical history on file.    Past Surgical History:  No past surgical history on file.    Family History History:  No family history on file.    History of Tobacco Use:  History   Smoking Status    Former    Types: Cigarettes   Smokeless Tobacco    Never       Current Medications:  Current Outpatient Medications   Medication Sig Dispense Refill    hydrOXYzine (ATARAX) 25 MG tablet Take 1 tablet (25 mg) by mouth 2 times daily as needed for itching (Patient not taking: Reported on 5/29/2024) 10 tablet 0    polyethylene glycol (MIRALAX) 17 GM/Dose powder Take 34 g by mouth (Patient not taking: Reported on 5/29/2024)         Allergies:  No Known Allergies    ROS:  Pertinent items are noted in HPI.  All other systems are negative.    PHYSICAL EXAM:     Vital signs: /66 (BP Location: Left arm, Cuff Size: Adult Regular)   Pulse 111   Temp 97.3  F (36.3  C)   Resp 14   Ht 1.702 m (5' 7\")   Wt 73.9 kg (163 lb)   LMP 04/28/2024   SpO2 100%   BMI 25.53 kg/m     Weight: [unfilled]   BMI: Body mass index is 25.53 kg/m .   General: Normal, healthy, cooperative, in no acute distress, alert, thin   Skin: no jaundice   HEENT: PERRLA and EOMI   Neck: supple     LABORATORY:  CBC RESULTS:   Recent Labs   Lab Test 05/20/24  1825   WBC 6.3   RBC 4.50   HGB 12.8   HCT 38.5   MCV 86   MCH 28.4   MCHC 33.2   RDW 13.5          Last Comprehensive Metabolic " Panel:  Sodium   Date Value Ref Range Status   05/20/2024 139 135 - 145 mmol/L Final     Comment:     Reference intervals for this test were updated on 09/26/2023 to more accurately reflect our healthy population. There may be differences in the flagging of prior results with similar values performed with this method. Interpretation of those prior results can be made in the context of the updated reference intervals.      Potassium   Date Value Ref Range Status   05/20/2024 3.7 3.4 - 5.3 mmol/L Final     Chloride   Date Value Ref Range Status   05/20/2024 105 98 - 107 mmol/L Final     Carbon Dioxide (CO2)   Date Value Ref Range Status   05/20/2024 24 22 - 29 mmol/L Final     Anion Gap   Date Value Ref Range Status   05/20/2024 10 7 - 15 mmol/L Final     Glucose   Date Value Ref Range Status   05/20/2024 99 70 - 99 mg/dL Final     Urea Nitrogen   Date Value Ref Range Status   05/20/2024 7.0 6.0 - 20.0 mg/dL Final     Creatinine   Date Value Ref Range Status   05/20/2024 0.79 0.51 - 0.95 mg/dL Final     GFR Estimate   Date Value Ref Range Status   05/20/2024 >90 >60 mL/min/1.73m2 Final     Calcium   Date Value Ref Range Status   05/20/2024 9.5 8.6 - 10.0 mg/dL Final     Bilirubin Total   Date Value Ref Range Status   05/20/2024 0.4 <=1.2 mg/dL Final     Alkaline Phosphatase   Date Value Ref Range Status   05/20/2024 68 40 - 150 U/L Final     ALT   Date Value Ref Range Status   05/20/2024 14 0 - 50 U/L Final     Comment:     Reference intervals for this test were updated on 6/12/2023 to more accurately reflect our healthy population. There may be differences in the flagging of prior results with similar values performed with this method. Interpretation of those prior results can be made in the context of the updated reference intervals.       AST   Date Value Ref Range Status   05/20/2024 16 0 - 35 U/L Final     Comment:     Reference intervals for this test were updated on 6/12/2023 to more accurately reflect our  healthy population. There may be differences in the flagging of prior results with similar values performed with this method. Interpretation of those prior results can be made in the context of the updated reference intervals.       PRIOR PERTINENT RADIOLOGY STUDIES:   Noted above    ASSESSMENT:    18 year old female with Symptomoatic Cholelithiasis, and normal hepatic labs, CT showing dilated intra and extrahepatic ducts.  The CT showing the dilated ducts there is some worrisome over choledocholithiasis.      PLAN:   Will plan on a laparoscopic cholecystectomy possible open procedure.  The procedure was explained with its risk, benefits and alternatives.  Risks include but are not limited to conversion to an open procedure, wound infections, development of an incisional hernia, damager to internal organs, damage to the common bile duct, need for additional procedures.  All of the patients questions were answered.  Patient consents to proceed.  The procedure will be scheduled.    Since her CT shows dilated ductal anatomy we will go ahead and get an MRCP.  The MRCP is negative for choledocholithiasis we will plan on doing laparoscopic cholecystectomy otherwise refer to GI for ERCP and clearance of the common bile duct.     Preoperative physical is needed : YES

## 2024-05-29 NOTE — PATIENT INSTRUCTIONS
Thank you for allowing Dr. Arreguin and our surgical team to participate in your care. Please call our health unit coordinator at [phone] with scheduling questions or the nurse at [phone] with any other questions or concerns.    You have been scheduled for:     SURGERY EDUCATION NURSE TO CALL ONE WEEK PRIOR TO PROCEDURE, IF YOU DO NOT HEAR FROM THEM YOU CAN CALL  [phone]    Please see handout for additional instruction.  You WILL need a pre-operative appointment with your primary care provider.  You may call [phone] or [phone] with any questions.

## 2024-06-05 ENCOUNTER — OFFICE VISIT (OUTPATIENT)
Dept: FAMILY MEDICINE | Facility: OTHER | Age: 19
End: 2024-06-05
Attending: STUDENT IN AN ORGANIZED HEALTH CARE EDUCATION/TRAINING PROGRAM
Payer: MEDICAID

## 2024-06-05 VITALS
OXYGEN SATURATION: 98 % | RESPIRATION RATE: 16 BRPM | HEIGHT: 67 IN | SYSTOLIC BLOOD PRESSURE: 137 MMHG | WEIGHT: 162.4 LBS | BODY MASS INDEX: 25.49 KG/M2 | DIASTOLIC BLOOD PRESSURE: 60 MMHG | TEMPERATURE: 97.7 F | HEART RATE: 94 BPM

## 2024-06-05 DIAGNOSIS — F40.01 PANIC DISORDER WITH AGORAPHOBIA: ICD-10-CM

## 2024-06-05 DIAGNOSIS — F41.1 GENERALIZED ANXIETY DISORDER: ICD-10-CM

## 2024-06-05 DIAGNOSIS — K80.21 CALCULUS OF GALLBLADDER WITH BILIARY OBSTRUCTION BUT WITHOUT CHOLECYSTITIS: ICD-10-CM

## 2024-06-05 DIAGNOSIS — F43.10 PTSD (POST-TRAUMATIC STRESS DISORDER): ICD-10-CM

## 2024-06-05 DIAGNOSIS — Z76.89 ENCOUNTER TO ESTABLISH CARE: Primary | ICD-10-CM

## 2024-06-05 PROCEDURE — 99214 OFFICE O/P EST MOD 30 MIN: CPT | Performed by: STUDENT IN AN ORGANIZED HEALTH CARE EDUCATION/TRAINING PROGRAM

## 2024-06-05 PROCEDURE — G0463 HOSPITAL OUTPT CLINIC VISIT: HCPCS | Performed by: STUDENT IN AN ORGANIZED HEALTH CARE EDUCATION/TRAINING PROGRAM

## 2024-06-05 RX ORDER — SERTRALINE HYDROCHLORIDE 25 MG/1
25 TABLET, FILM COATED ORAL DAILY
Qty: 30 TABLET | Refills: 1 | Status: ON HOLD | OUTPATIENT
Start: 2024-06-05 | End: 2024-07-01

## 2024-06-05 ASSESSMENT — ANXIETY QUESTIONNAIRES
GAD7 TOTAL SCORE: 4
1. FEELING NERVOUS, ANXIOUS, OR ON EDGE: MORE THAN HALF THE DAYS
7. FEELING AFRAID AS IF SOMETHING AWFUL MIGHT HAPPEN: NOT AT ALL
5. BEING SO RESTLESS THAT IT IS HARD TO SIT STILL: NOT AT ALL
4. TROUBLE RELAXING: NOT AT ALL
6. BECOMING EASILY ANNOYED OR IRRITABLE: NOT AT ALL
IF YOU CHECKED OFF ANY PROBLEMS ON THIS QUESTIONNAIRE, HOW DIFFICULT HAVE THESE PROBLEMS MADE IT FOR YOU TO DO YOUR WORK, TAKE CARE OF THINGS AT HOME, OR GET ALONG WITH OTHER PEOPLE: NOT DIFFICULT AT ALL
GAD7 TOTAL SCORE: 4
3. WORRYING TOO MUCH ABOUT DIFFERENT THINGS: SEVERAL DAYS
8. IF YOU CHECKED OFF ANY PROBLEMS, HOW DIFFICULT HAVE THESE MADE IT FOR YOU TO DO YOUR WORK, TAKE CARE OF THINGS AT HOME, OR GET ALONG WITH OTHER PEOPLE?: NOT DIFFICULT AT ALL
GAD7 TOTAL SCORE: 4
7. FEELING AFRAID AS IF SOMETHING AWFUL MIGHT HAPPEN: NOT AT ALL
2. NOT BEING ABLE TO STOP OR CONTROL WORRYING: SEVERAL DAYS

## 2024-06-05 ASSESSMENT — PATIENT HEALTH QUESTIONNAIRE - PHQ9
10. IF YOU CHECKED OFF ANY PROBLEMS, HOW DIFFICULT HAVE THESE PROBLEMS MADE IT FOR YOU TO DO YOUR WORK, TAKE CARE OF THINGS AT HOME, OR GET ALONG WITH OTHER PEOPLE: NOT DIFFICULT AT ALL
SUM OF ALL RESPONSES TO PHQ QUESTIONS 1-9: 5
SUM OF ALL RESPONSES TO PHQ QUESTIONS 1-9: 5

## 2024-06-05 ASSESSMENT — PAIN SCALES - GENERAL: PAINLEVEL: NO PAIN (0)

## 2024-06-05 NOTE — PATIENT INSTRUCTIONS
Psychologists/ Counselors                        Angelique Carolina          ...            ..[phone]  [facility]                    ..  [phone]  [facility]                 .[phone]  [facility] (kids)       .         [phone]  [facility](teens)                ..[phone]  [facility]                    [phone]  [facility]                   [phone]  [facility]           ...      .. [phone]  [facility]                ...[phone]  [facility]     ...          ...[phone]  [facility]               [phone]   [facility]..            [phone]  [facility]                       [phone]  [facility]               .    [phone]  [facility]                ..[phone]  [facility]     .      . ..[phone]     [facility]              .   [phone]  [facility]                   [phone]              [facility]              .   [phone]  [facility]              .  ...[phone]  [facility]              .   ..[phone]  [facility]           ...      .. [phone]  [facility]              . ..[phone]  [facility]              .    ..[phone]  [facility]              .     .. [phone]  [facility]                 ... [phone]  [facility]                         .[phone]  [facility]     .      . ..[phone]            [facility]              ..  [phone]                   [facility]             . ... ..[phone]  [facility]              .      ..[phone]  [facility]              .     ..[phone]  [facility]        .      . [phone]  [facility]           .[phone]  [facility]         .    ..[phone]  [facility]             . ..  ..  [phone]  [facility]            [phone]  [facility]              ..[phone]  [facility]                     [phone]    [facility]           ..     [phone]     [facility]    ...     ...[phone]     [facility]            [phone]                                                  [place]  Colby De Le[facility]                ..  .  [phone]  [facility]         .     .  [phone]  [facility]              . [phone]  [facility]  ..        [phone]  [facility]              .    [phone]    [facility]               ...  .  [phone]  [facility]             . [phone]  [facility]            [phone]  [facility]     .      . ..[phone]         *Facilities in bold italics indicate medication management  Services are offered.     Crisis support    If you or someone you know is struggling or in crisis, help is available. Call or text 726 or chat [email or web address]    The volunteer Crisis Counselor will help you move from a 'hot moment to a cool moment'
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
## 2024-06-10 ENCOUNTER — TELEPHONE (OUTPATIENT)
Dept: SURGERY | Facility: OTHER | Age: 19
End: 2024-06-10

## 2024-06-11 NOTE — TELEPHONE ENCOUNTER
Called patient and helped her reschedule her ERCP. She is currently scheduled for 6/20 @ 0800.     Kathy Vu RN on 6/11/2024 at 2:01 PM

## 2024-06-18 ENCOUNTER — OFFICE VISIT (OUTPATIENT)
Dept: FAMILY MEDICINE | Facility: OTHER | Age: 19
End: 2024-06-18
Attending: STUDENT IN AN ORGANIZED HEALTH CARE EDUCATION/TRAINING PROGRAM
Payer: MEDICAID

## 2024-06-18 VITALS
SYSTOLIC BLOOD PRESSURE: 130 MMHG | RESPIRATION RATE: 17 BRPM | WEIGHT: 160.2 LBS | HEART RATE: 105 BPM | BODY MASS INDEX: 25.15 KG/M2 | DIASTOLIC BLOOD PRESSURE: 60 MMHG | HEIGHT: 67 IN | OXYGEN SATURATION: 98 % | TEMPERATURE: 97.8 F

## 2024-06-18 DIAGNOSIS — F41.1 GENERALIZED ANXIETY DISORDER: ICD-10-CM

## 2024-06-18 DIAGNOSIS — Z01.818 PRE-OP EXAM: Primary | ICD-10-CM

## 2024-06-18 DIAGNOSIS — K80.21 CALCULUS OF GALLBLADDER WITH BILIARY OBSTRUCTION BUT WITHOUT CHOLECYSTITIS: ICD-10-CM

## 2024-06-18 DIAGNOSIS — K59.01 SLOW TRANSIT CONSTIPATION: ICD-10-CM

## 2024-06-18 LAB
ALBUMIN SERPL BCG-MCNC: 4.6 G/DL (ref 3.5–5.2)
ALP SERPL-CCNC: 65 U/L (ref 40–150)
ALT SERPL W P-5'-P-CCNC: 11 U/L (ref 0–50)
ANION GAP SERPL CALCULATED.3IONS-SCNC: 10 MMOL/L (ref 7–15)
AST SERPL W P-5'-P-CCNC: 15 U/L (ref 0–35)
BASOPHILS # BLD AUTO: 0 10E3/UL (ref 0–0.2)
BASOPHILS NFR BLD AUTO: 1 %
BILIRUB SERPL-MCNC: 0.5 MG/DL
BUN SERPL-MCNC: 10 MG/DL (ref 6–20)
CALCIUM SERPL-MCNC: 9.6 MG/DL (ref 8.6–10)
CHLORIDE SERPL-SCNC: 107 MMOL/L (ref 98–107)
CREAT SERPL-MCNC: 0.84 MG/DL (ref 0.51–0.95)
DEPRECATED HCO3 PLAS-SCNC: 24 MMOL/L (ref 22–29)
EGFRCR SERPLBLD CKD-EPI 2021: >90 ML/MIN/1.73M2
EOSINOPHIL # BLD AUTO: 0 10E3/UL (ref 0–0.7)
EOSINOPHIL NFR BLD AUTO: 1 %
ERYTHROCYTE [DISTWIDTH] IN BLOOD BY AUTOMATED COUNT: 13.2 % (ref 10–15)
GLUCOSE SERPL-MCNC: 103 MG/DL (ref 70–99)
HCT VFR BLD AUTO: 39.4 % (ref 35–47)
HGB BLD-MCNC: 13.3 G/DL (ref 11.7–15.7)
IMM GRANULOCYTES # BLD: 0 10E3/UL
IMM GRANULOCYTES NFR BLD: 0 %
LYMPHOCYTES # BLD AUTO: 1.4 10E3/UL (ref 0.8–5.3)
LYMPHOCYTES NFR BLD AUTO: 34 %
MCH RBC QN AUTO: 28.7 PG (ref 26.5–33)
MCHC RBC AUTO-ENTMCNC: 33.8 G/DL (ref 31.5–36.5)
MCV RBC AUTO: 85 FL (ref 78–100)
MONOCYTES # BLD AUTO: 0.3 10E3/UL (ref 0–1.3)
MONOCYTES NFR BLD AUTO: 7 %
NEUTROPHILS # BLD AUTO: 2.4 10E3/UL (ref 1.6–8.3)
NEUTROPHILS NFR BLD AUTO: 58 %
NRBC # BLD AUTO: 0 10E3/UL
NRBC BLD AUTO-RTO: 0 /100
PLATELET # BLD AUTO: 158 10E3/UL (ref 150–450)
POTASSIUM SERPL-SCNC: 3.5 MMOL/L (ref 3.4–5.3)
PROT SERPL-MCNC: 7.5 G/DL (ref 6.4–8.3)
RBC # BLD AUTO: 4.64 10E6/UL (ref 3.8–5.2)
SODIUM SERPL-SCNC: 141 MMOL/L (ref 135–145)
WBC # BLD AUTO: 4.2 10E3/UL (ref 4–11)

## 2024-06-18 PROCEDURE — 85025 COMPLETE CBC W/AUTO DIFF WBC: CPT | Mod: ZL | Performed by: STUDENT IN AN ORGANIZED HEALTH CARE EDUCATION/TRAINING PROGRAM

## 2024-06-18 PROCEDURE — 36415 COLL VENOUS BLD VENIPUNCTURE: CPT | Mod: ZL | Performed by: STUDENT IN AN ORGANIZED HEALTH CARE EDUCATION/TRAINING PROGRAM

## 2024-06-18 PROCEDURE — 99213 OFFICE O/P EST LOW 20 MIN: CPT | Performed by: STUDENT IN AN ORGANIZED HEALTH CARE EDUCATION/TRAINING PROGRAM

## 2024-06-18 PROCEDURE — G0463 HOSPITAL OUTPT CLINIC VISIT: HCPCS | Performed by: STUDENT IN AN ORGANIZED HEALTH CARE EDUCATION/TRAINING PROGRAM

## 2024-06-18 PROCEDURE — 80053 COMPREHEN METABOLIC PANEL: CPT | Mod: ZL | Performed by: STUDENT IN AN ORGANIZED HEALTH CARE EDUCATION/TRAINING PROGRAM

## 2024-06-18 RX ORDER — DOCUSATE SODIUM 100 MG/1
100 CAPSULE, LIQUID FILLED ORAL 2 TIMES DAILY
Qty: 60 CAPSULE | Refills: 1 | Status: SHIPPED | OUTPATIENT
Start: 2024-06-18

## 2024-06-18 ASSESSMENT — PAIN SCALES - GENERAL: PAINLEVEL: NO PAIN (0)

## 2024-06-18 NOTE — PROGRESS NOTES
Preoperative Evaluation  [facility] [place]  [address]  [place] [address]  Phone: [phone]  Primary Provider: Odalis Davis MD  Pre-op Performing Provider: Odalis Davis MD  Jun 18, 2024 6/18/2024   Surgical Information   What procedure is being done? CHOLECYSTECTOMY, LAPAROSCOPIC       Facility or Hospital where procedure/surgery will be performed: [facility]   Who is doing the procedure / surgery? Titi Arreguin       Date of surgery / procedure: 07/01/2024       Time of surgery / procedure: TBD       Where do you plan to recover after surgery? Other -     at home with family     Fax number for surgical facility: Note does not need to be faxed, will be available electronically in Epic.    Assessment & Plan     The proposed surgical procedure is considered INTERMEDIATE risk.    Pre-op exam  Rylee presents to clinic for pre-op clearance  She has no medical problems and reports no issues with anesthesia previously (no documented surgeries).  She reports no bleeding issues and denies any exertional symptoms  Blood work is reassuring.  - CBC with platelets and differential; Future  - Comprehensive metabolic panel; Future  - CBC with platelets and differential  - Comprehensive metabolic panel    Calculus of gallbladder with biliary obstruction but without cholecystitis  Indication for surgery. Negative mejias on exam.  Benign abdominal exam otherwise and patient reports no recent symptoms.  On the basis of gallstones, recommended to proceed with surgery for definitive management    Generalized anxiety disorder  Stable.    Slow transit constipation  Will Rx Colace 100 mg BID.  Recommend high fiber diet and adequate hydration     - No identified additional risk factors other than previously addressed    Antiplatelet or Anticoagulation Medication Instructions   - Patient is on no antiplatelet or anticoagulation medications.    Additional Medication Instructions    - fiber, laxatives: DO NOT TAKE day of surgery    Recommendation  Approval given to proceed with proposed procedure, without further diagnostic evaluation.      Subjective   Rylee is a 18 year old, presenting for the following:  Pre-Op Exam    Rylee is here for a pre-op evaluation.      No problems with anesthesia in the past  No bleeding problems  No snoring and no sleep apnea.    No exertional CP, SOB, palpitations, dizziness, lightheadedness        6/5/2024     9:39 AM   Additional Questions   Roomed by Cece Brar LPN, BELA, MHP   Accompanied by Mother     HPI related to upcoming procedure: Elective cholecystectomy        6/18/2024   Pre-Op Questionnaire   Have you ever had a heart attack or stroke? No   Have you ever had surgery on your heart or blood vessels, such as a stent placement, a coronary artery bypass, or surgery on an artery in your head, neck, heart, or legs? No   Do you have chest pain with activity? No   Do you have a history of heart failure? No   Do you currently have a cold, bronchitis or symptoms of other infection? No   Do you have a cough, shortness of breath, or wheezing? No   Do you or anyone in your family have previous history of blood clots? (!) YES Grandma has had clots in the past    Do you or does anyone in your family have a serious bleeding problem such as prolonged bleeding following surgeries or cuts? No   Have you ever had problems with anemia or been told to take iron pills? No   Have you had any abnormal blood loss such as black, tarry or bloody stools, or abnormal vaginal bleeding? (!) YES blood in stool a few times    Have you ever had a blood transfusion? No   Are you willing to have a blood transfusion if it is medically needed before, during, or after your surgery? Yes   Have you or any of your relatives ever had problems with anesthesia? No   Do you have sleep apnea, excessive snoring or daytime drowsiness? No   Do you have any artifical heart valves or other  implanted medical devices like a pacemaker, defibrillator, or continuous glucose monitor? No   Do you have artificial joints? No   Are you allergic to latex? No     Health Care Directive  Patient does not have a Health Care Directive or Living Will: Discussed advance care planning with patient; however, patient declined at this time.    Preoperative Review of    reviewed - no record of controlled substances prescribed.    Status of Chronic Conditions:  See problem list for active medical problems.  Problems all longstanding and stable, except as noted/documented.  See ROS for pertinent symptoms related to these conditions.    Patient Active Problem List    Diagnosis Date Noted    Closed fracture of base of fifth metatarsal bone of left foot, initial encounter 2020     Priority: Medium    Adjustment disorder with mixed disturbance of emotions and conduct 2016     Priority: Medium     Formatting of this note might be different from the original. See scanned visit from [facility] - 2015.  CTSS services, therapy in school      Bilateral vesicoureteral reflux 2011     Priority: Medium     Formatting of this note might be different from the original. [facility] IMPRESSION: 1. Grade IV left vesicoureteral reflux. 2. Grade III right vesicoureteral reflux. 3. Moderate post void residual in the bladder. IMO Update 10/11      Urinary incontinence 2010     Priority: Medium     Formatting of this note might be different from the original. Urology visit 16 - behavioral modifications, habit toileting.  If no improvement in 2 months - mom is to contact urology and will consider oxybutynin.  Also with constipation - recommended miralax cleanout, then maintainence      Exotropia 2009     Priority: Medium     Formatting of this note might be different from the original. IMO Update      Astigmatism 2009     Priority: Medium     Formatting of this note might be different from the  "original. IMO Update      Myopia 04/28/2009     Priority: Medium      No past medical history on file.  No past surgical history on file.  Current Outpatient Medications   Medication Sig Dispense Refill    hydrOXYzine (ATARAX) 25 MG tablet Take 1 tablet (25 mg) by mouth 2 times daily as needed for itching (Patient not taking: Reported on 5/29/2024) 10 tablet 0    polyethylene glycol (MIRALAX) 17 GM/Dose powder Take 34 g by mouth (Patient not taking: Reported on 5/29/2024)      sertraline (ZOLOFT) 25 MG tablet Take 1 tablet (25 mg) by mouth daily 30 tablet 1       No Known Allergies     Social History     Tobacco Use    Smoking status: Former     Types: Cigarettes    Smokeless tobacco: Never   Substance Use Topics    Alcohol use: Not Currently     Family History   Problem Relation Age of Onset    Substance Abuse Mother     Diabetes Type 2  Maternal Grandmother     Peripheral Neuropathy Maternal Grandmother     Hypertension Maternal Grandfather     Heart Failure Maternal Grandfather      History   Drug Use Unknown             Review of Systems  Constitutional, HEENT, cardiovascular, pulmonary, GI, , musculoskeletal, neuro, skin, endocrine and psych systems are negative, except as otherwise noted.    Objective    /60 (BP Location: Right arm, Patient Position: Sitting, Cuff Size: Adult Regular)   Pulse 105   Temp 97.8  F (36.6  C) (Tympanic)   Resp 17   Ht 1.702 m (5' 7\")   Wt 72.7 kg (160 lb 3.2 oz)   LMP 05/27/2024 (Exact Date)   SpO2 98%   BMI 25.09 kg/m     Estimated body mass index is 25.09 kg/m  as calculated from the following:    Height as of this encounter: 1.702 m (5' 7\").    Weight as of this encounter: 72.7 kg (160 lb 3.2 oz).  Physical Exam  GENERAL: alert and no distress  EYES: Eyes grossly normal to inspection, PERRL and conjunctivae and sclerae normal  HENT: ear canals and TM's normal, nose and mouth without ulcers or lesions  NECK: no adenopathy, no asymmetry, masses, or scars  RESP: " lungs clear to auscultation - no rales, rhonchi or wheezes  CV: regular rate and rhythm, normal S1 S2, no S3 or S4, no murmur, click or rub, no peripheral edema  ABDOMEN: soft, nontender, no hepatosplenomegaly, no masses and bowel sounds normal  MS: no gross musculoskeletal defects noted, no edema  SKIN: no suspicious lesions or rashes  NEURO: Normal strength and tone, mentation intact and speech normal  PSYCH: mentation appears normal, affect normal/bright    Recent Labs   Lab Test 05/20/24  1825 03/24/24  2130   HGB 12.8 12.0    166    139   POTASSIUM 3.7 3.3*   CR 0.79 0.81        Diagnostics  Recent Results (from the past 24 hour(s))   CBC with platelets and differential    Collection Time: 06/18/24  2:30 PM   Result Value Ref Range    WBC Count 4.2 4.0 - 11.0 10e3/uL    RBC Count 4.64 3.80 - 5.20 10e6/uL    Hemoglobin 13.3 11.7 - 15.7 g/dL    Hematocrit 39.4 35.0 - 47.0 %    MCV 85 78 - 100 fL    MCH 28.7 26.5 - 33.0 pg    MCHC 33.8 31.5 - 36.5 g/dL    RDW 13.2 10.0 - 15.0 %    Platelet Count 158 150 - 450 10e3/uL    % Neutrophils 58 %    % Lymphocytes 34 %    % Monocytes 7 %    % Eosinophils 1 %    % Basophils 1 %    % Immature Granulocytes 0 %    NRBCs per 100 WBC 0 <1 /100    Absolute Neutrophils 2.4 1.6 - 8.3 10e3/uL    Absolute Lymphocytes 1.4 0.8 - 5.3 10e3/uL    Absolute Monocytes 0.3 0.0 - 1.3 10e3/uL    Absolute Eosinophils 0.0 0.0 - 0.7 10e3/uL    Absolute Basophils 0.0 0.0 - 0.2 10e3/uL    Absolute Immature Granulocytes 0.0 <=0.4 10e3/uL    Absolute NRBCs 0.0 10e3/uL      No EKG required, no history of coronary heart disease, significant arrhythmia, peripheral arterial disease or other structural heart disease.    Revised Cardiac Risk Index (RCRI)  The patient has the following serious cardiovascular risks for perioperative complications:   - No serious cardiac risks = 0 points     RCRI Interpretation: 0 points: Class I (very low risk - 0.4% complication rate)         Signed  Electronically by: Odalis Davis MD  Copy of this evaluation report is provided to requesting physician.

## 2024-06-20 ENCOUNTER — ANESTHESIA EVENT (OUTPATIENT)
Dept: SURGERY | Facility: HOSPITAL | Age: 19
End: 2024-06-20
Payer: MEDICAID

## 2024-06-20 ENCOUNTER — HOSPITAL ENCOUNTER (OUTPATIENT)
Dept: MRI IMAGING | Facility: HOSPITAL | Age: 19
Discharge: HOME | End: 2024-06-20
Attending: SURGERY | Admitting: SURGERY
Payer: MEDICAID

## 2024-06-20 DIAGNOSIS — K83.8 DILATED BILE DUCT: ICD-10-CM

## 2024-06-20 DIAGNOSIS — K80.20 SYMPTOMATIC CHOLELITHIASIS: ICD-10-CM

## 2024-06-20 PROCEDURE — 74181 MRI ABDOMEN W/O CONTRAST: CPT

## 2024-06-20 ASSESSMENT — LIFESTYLE VARIABLES: TOBACCO_USE: 1

## 2024-06-20 NOTE — ANESTHESIA PREPROCEDURE EVALUATION
Anesthesia Pre-Procedure Evaluation    Patient: Rylee J Huttel   MRN: [id number] : 2005        Procedure : Procedure(s):  CHOLECYSTECTOMY, LAPAROSCOPIC          No past medical history on file.   No past surgical history on file.   No Known Allergies   Social History     Tobacco Use    Smoking status: Former     Types: Cigarettes    Smokeless tobacco: Never   Substance Use Topics    Alcohol use: Not Currently      Wt Readings from Last 1 Encounters:   24 72.7 kg (160 lb 3.2 oz) (87%, Z= 1.13)*     * Growth percentiles are based on CDC (Girls, 2-20 Years) data.        Anesthesia Evaluation   Pt has had prior anesthetic. Type: General.        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 conduct    (+) psychiatric history other (comment), anxiety and depression       Infectious Disease:  - neg infectious disease ROS     Malignancy:  - neg malignancy ROS     Other:            Physical Exam    Airway        Mallampati: II   TM distance: > 3 FB   Neck ROM: full   Mouth opening: > 3 cm    Respiratory Devices and Support         Dental     Comment: Loose right upper molar    (+) Minor Abnormalities - some fillings, tiny chips      Cardiovascular          Rhythm and rate: regular and normal     Pulmonary           breath sounds clear to auscultation           OUTSIDE LABS:  CBC:   Lab Results   Component Value Date    WBC 4.2 2024    WBC 6.3 2024    HGB 13.3 2024    HGB 12.8 2024    HCT 39.4 2024    HCT 38.5  "05/20/2024     06/18/2024     05/20/2024     BMP:   Lab Results   Component Value Date     06/18/2024     05/20/2024    POTASSIUM 3.5 06/18/2024    POTASSIUM 3.7 05/20/2024    CHLORIDE 107 06/18/2024    CHLORIDE 105 05/20/2024    CO2 24 06/18/2024    CO2 24 05/20/2024    BUN 10.0 06/18/2024    BUN 7.0 05/20/2024    CR 0.84 06/18/2024    CR 0.79 05/20/2024     (H) 06/18/2024    GLC 99 05/20/2024     COAGS: No results found for: \"PTT\", \"INR\", \"FIBR\"  POC:   Lab Results   Component Value Date    HCG Negative 05/20/2024     HEPATIC:   Lab Results   Component Value Date    ALBUMIN 4.6 06/18/2024    PROTTOTAL 7.5 06/18/2024    ALT 11 06/18/2024    AST 15 06/18/2024    ALKPHOS 65 06/18/2024    BILITOTAL 0.5 06/18/2024     OTHER:   Lab Results   Component Value Date    GENEVIEVE 9.6 06/18/2024    LIPASE 11 (L) 05/20/2024       Anesthesia Plan    ASA Status:  2    NPO Status:  NPO Appropriate (food 2100 last pm [id number] last pm)    Anesthesia Type: General.     - Airway: ETT   Induction: Intravenous.           Consents    Anesthesia Plan(s) and associated risks, benefits, and realistic alternatives discussed. Questions answered and patient/representative(s) expressed understanding.     - Discussed:     - Discussed with:  Patient      - Extended Intubation/Ventilatory Support Discussed: No.      - Patient is DNR/DNI Status: No     Use of blood products discussed: No .     Postoperative Care    Pain management: IV analgesics.   PONV prophylaxis: Ondansetron (or other 5HT-3), Dexamethasone or Solumedrol     Comments:    Other Comments:  6/18  Discussed risks and benefits with patient for general anesthesia including sore throat, nausea, vomiting, aspiration, dental damage, loss of airway, CV complications, stroke, MI, death. Pt wishes to proceed.              Mekhi Ramos APRN CRNA    I have reviewed the pertinent notes and labs in the chart from the past 30 days and (re)examined the patient.  " Any updates or changes from those notes are reflected in this note.

## 2024-07-01 ENCOUNTER — LAB (OUTPATIENT)
Dept: LAB | Facility: HOSPITAL | Age: 19
End: 2024-07-01
Attending: SURGERY
Payer: MEDICAID

## 2024-07-01 ENCOUNTER — ANESTHESIA (OUTPATIENT)
Dept: SURGERY | Facility: HOSPITAL | Age: 19
End: 2024-07-01
Payer: MEDICAID

## 2024-07-01 ENCOUNTER — HOSPITAL ENCOUNTER (OUTPATIENT)
Facility: HOSPITAL | Age: 19
Discharge: HOME | End: 2024-07-01
Attending: SURGERY | Admitting: SURGERY
Payer: MEDICAID

## 2024-07-01 VITALS
DIASTOLIC BLOOD PRESSURE: 63 MMHG | OXYGEN SATURATION: 97 % | SYSTOLIC BLOOD PRESSURE: 123 MMHG | HEIGHT: 67 IN | RESPIRATION RATE: 18 BRPM | BODY MASS INDEX: 25.11 KG/M2 | HEART RATE: 64 BPM | TEMPERATURE: 98.1 F | WEIGHT: 160 LBS

## 2024-07-01 DIAGNOSIS — K81.9 CHOLECYSTITIS: Primary | ICD-10-CM

## 2024-07-01 LAB — HCG UR QL: NEGATIVE

## 2024-07-01 PROCEDURE — 272N000001 HC OR GENERAL SUPPLY STERILE: Performed by: SURGERY

## 2024-07-01 PROCEDURE — 250N000011 HC RX IP 250 OP 636: Performed by: NURSE ANESTHETIST, CERTIFIED REGISTERED

## 2024-07-01 PROCEDURE — 88304 TISSUE EXAM BY PATHOLOGIST: CPT | Mod: TC | Performed by: SURGERY

## 2024-07-01 PROCEDURE — 250N000013 HC RX MED GY IP 250 OP 250 PS 637: Performed by: SURGERY

## 2024-07-01 PROCEDURE — 250N000011 HC RX IP 250 OP 636: Performed by: SURGERY

## 2024-07-01 PROCEDURE — 88304 TISSUE EXAM BY PATHOLOGIST: CPT | Mod: 26 | Performed by: PATHOLOGY

## 2024-07-01 PROCEDURE — 370N000017 HC ANESTHESIA TECHNICAL FEE, PER MIN: Performed by: SURGERY

## 2024-07-01 PROCEDURE — 250N000025 HC SEVOFLURANE, PER MIN: Performed by: SURGERY

## 2024-07-01 PROCEDURE — 47562 LAPAROSCOPIC CHOLECYSTECTOMY: CPT | Performed by: SURGERY

## 2024-07-01 PROCEDURE — 710N000010 HC RECOVERY PHASE 1, LEVEL 2, PER MIN: Performed by: SURGERY

## 2024-07-01 PROCEDURE — 999N000141 HC STATISTIC PRE-PROCEDURE NURSING ASSESSMENT: Performed by: SURGERY

## 2024-07-01 PROCEDURE — 710N000012 HC RECOVERY PHASE 2, PER MINUTE: Performed by: SURGERY

## 2024-07-01 PROCEDURE — 258N000003 HC RX IP 258 OP 636: Performed by: NURSE ANESTHETIST, CERTIFIED REGISTERED

## 2024-07-01 PROCEDURE — 360N000076 HC SURGERY LEVEL 3, PER MIN: Performed by: SURGERY

## 2024-07-01 PROCEDURE — 250N000009 HC RX 250: Performed by: NURSE ANESTHETIST, CERTIFIED REGISTERED

## 2024-07-01 PROCEDURE — 81025 URINE PREGNANCY TEST: CPT | Performed by: NURSE ANESTHETIST, CERTIFIED REGISTERED

## 2024-07-01 PROCEDURE — 47562 LAPAROSCOPIC CHOLECYSTECTOMY: CPT | Performed by: NURSE ANESTHETIST, CERTIFIED REGISTERED

## 2024-07-01 RX ORDER — ONDANSETRON 4 MG/1
4 TABLET, ORALLY DISINTEGRATING ORAL EVERY 30 MIN PRN
Status: DISCONTINUED | OUTPATIENT
Start: 2024-07-01 | End: 2024-07-01 | Stop reason: HOSPADM

## 2024-07-01 RX ORDER — HYDROMORPHONE HYDROCHLORIDE 1 MG/ML
0.4 INJECTION, SOLUTION INTRAMUSCULAR; INTRAVENOUS; SUBCUTANEOUS EVERY 5 MIN PRN
Status: DISCONTINUED | OUTPATIENT
Start: 2024-07-01 | End: 2024-07-01 | Stop reason: HOSPADM

## 2024-07-01 RX ORDER — LIDOCAINE HYDROCHLORIDE 20 MG/ML
INJECTION, SOLUTION INFILTRATION; PERINEURAL PRN
Status: DISCONTINUED | OUTPATIENT
Start: 2024-07-01 | End: 2024-07-01

## 2024-07-01 RX ORDER — ALBUTEROL SULFATE 0.83 MG/ML
2.5 SOLUTION RESPIRATORY (INHALATION) EVERY 4 HOURS PRN
Status: DISCONTINUED | OUTPATIENT
Start: 2024-07-01 | End: 2024-07-01 | Stop reason: HOSPADM

## 2024-07-01 RX ORDER — LIDOCAINE 40 MG/G
CREAM TOPICAL
Status: DISCONTINUED | OUTPATIENT
Start: 2024-07-01 | End: 2024-07-01 | Stop reason: HOSPADM

## 2024-07-01 RX ORDER — DEXAMETHASONE SODIUM PHOSPHATE 4 MG/ML
INJECTION, SOLUTION INTRA-ARTICULAR; INTRALESIONAL; INTRAMUSCULAR; INTRAVENOUS; SOFT TISSUE PRN
Status: DISCONTINUED | OUTPATIENT
Start: 2024-07-01 | End: 2024-07-01

## 2024-07-01 RX ORDER — PROPOFOL 10 MG/ML
INJECTION, EMULSION INTRAVENOUS PRN
Status: DISCONTINUED | OUTPATIENT
Start: 2024-07-01 | End: 2024-07-01

## 2024-07-01 RX ORDER — ONDANSETRON 2 MG/ML
INJECTION INTRAMUSCULAR; INTRAVENOUS PRN
Status: DISCONTINUED | OUTPATIENT
Start: 2024-07-01 | End: 2024-07-01

## 2024-07-01 RX ORDER — BUPIVACAINE HYDROCHLORIDE 5 MG/ML
INJECTION, SOLUTION PERINEURAL PRN
Status: DISCONTINUED | OUTPATIENT
Start: 2024-07-01 | End: 2024-07-01 | Stop reason: HOSPADM

## 2024-07-01 RX ORDER — HYDROCODONE BITARTRATE AND ACETAMINOPHEN 5; 325 MG/1; MG/1
1 TABLET ORAL
Status: COMPLETED | OUTPATIENT
Start: 2024-07-01 | End: 2024-07-01

## 2024-07-01 RX ORDER — OXYCODONE HYDROCHLORIDE 5 MG/1
5 TABLET ORAL
Status: DISCONTINUED | OUTPATIENT
Start: 2024-07-01 | End: 2024-07-01 | Stop reason: HOSPADM

## 2024-07-01 RX ORDER — NALOXONE HYDROCHLORIDE 0.4 MG/ML
0.1 INJECTION, SOLUTION INTRAMUSCULAR; INTRAVENOUS; SUBCUTANEOUS
Status: DISCONTINUED | OUTPATIENT
Start: 2024-07-01 | End: 2024-07-01 | Stop reason: HOSPADM

## 2024-07-01 RX ORDER — FENTANYL CITRATE 50 UG/ML
INJECTION, SOLUTION INTRAMUSCULAR; INTRAVENOUS PRN
Status: DISCONTINUED | OUTPATIENT
Start: 2024-07-01 | End: 2024-07-01

## 2024-07-01 RX ORDER — FENTANYL CITRATE 50 UG/ML
50 INJECTION, SOLUTION INTRAMUSCULAR; INTRAVENOUS EVERY 5 MIN PRN
Status: DISCONTINUED | OUTPATIENT
Start: 2024-07-01 | End: 2024-07-01 | Stop reason: HOSPADM

## 2024-07-01 RX ORDER — LABETALOL 20 MG/4 ML (5 MG/ML) INTRAVENOUS SYRINGE
10
Status: DISCONTINUED | OUTPATIENT
Start: 2024-07-01 | End: 2024-07-01 | Stop reason: HOSPADM

## 2024-07-01 RX ORDER — BUPIVACAINE HYDROCHLORIDE 5 MG/ML
INJECTION, SOLUTION EPIDURAL; INTRACAUDAL
Status: DISCONTINUED
Start: 2024-07-01 | End: 2024-07-01 | Stop reason: HOSPADM

## 2024-07-01 RX ORDER — ONDANSETRON 2 MG/ML
4 INJECTION INTRAMUSCULAR; INTRAVENOUS EVERY 30 MIN PRN
Status: DISCONTINUED | OUTPATIENT
Start: 2024-07-01 | End: 2024-07-01 | Stop reason: HOSPADM

## 2024-07-01 RX ORDER — HYDROMORPHONE HYDROCHLORIDE 1 MG/ML
0.2 INJECTION, SOLUTION INTRAMUSCULAR; INTRAVENOUS; SUBCUTANEOUS EVERY 5 MIN PRN
Status: DISCONTINUED | OUTPATIENT
Start: 2024-07-01 | End: 2024-07-01 | Stop reason: HOSPADM

## 2024-07-01 RX ORDER — METRONIDAZOLE 500 MG/100ML
500 INJECTION, SOLUTION INTRAVENOUS
Status: COMPLETED | OUTPATIENT
Start: 2024-07-01 | End: 2024-07-01

## 2024-07-01 RX ORDER — DEXAMETHASONE SODIUM PHOSPHATE 4 MG/ML
4 INJECTION, SOLUTION INTRA-ARTICULAR; INTRALESIONAL; INTRAMUSCULAR; INTRAVENOUS; SOFT TISSUE
Status: DISCONTINUED | OUTPATIENT
Start: 2024-07-01 | End: 2024-07-01 | Stop reason: HOSPADM

## 2024-07-01 RX ORDER — FENTANYL CITRATE 50 UG/ML
25 INJECTION, SOLUTION INTRAMUSCULAR; INTRAVENOUS EVERY 5 MIN PRN
Status: DISCONTINUED | OUTPATIENT
Start: 2024-07-01 | End: 2024-07-01 | Stop reason: HOSPADM

## 2024-07-01 RX ORDER — CEFAZOLIN SODIUM/WATER 2 G/20 ML
2 SYRINGE (ML) INTRAVENOUS
Status: COMPLETED | OUTPATIENT
Start: 2024-07-01 | End: 2024-07-01

## 2024-07-01 RX ORDER — DEXMEDETOMIDINE HYDROCHLORIDE 4 UG/ML
INJECTION, SOLUTION INTRAVENOUS PRN
Status: DISCONTINUED | OUTPATIENT
Start: 2024-07-01 | End: 2024-07-01

## 2024-07-01 RX ORDER — SODIUM CHLORIDE, SODIUM LACTATE, POTASSIUM CHLORIDE, CALCIUM CHLORIDE 600; 310; 30; 20 MG/100ML; MG/100ML; MG/100ML; MG/100ML
INJECTION, SOLUTION INTRAVENOUS CONTINUOUS
Status: DISCONTINUED | OUTPATIENT
Start: 2024-07-01 | End: 2024-07-01 | Stop reason: HOSPADM

## 2024-07-01 RX ORDER — HYDROCODONE BITARTRATE AND ACETAMINOPHEN 5; 325 MG/1; MG/1
1 TABLET ORAL EVERY 6 HOURS PRN
Qty: 18 TABLET | Refills: 0 | Status: SHIPPED | OUTPATIENT
Start: 2024-07-01 | End: 2024-07-04

## 2024-07-01 RX ORDER — KETOROLAC TROMETHAMINE 30 MG/ML
30 INJECTION, SOLUTION INTRAMUSCULAR; INTRAVENOUS ONCE
Status: COMPLETED | OUTPATIENT
Start: 2024-07-01 | End: 2024-07-01

## 2024-07-01 RX ORDER — FENTANYL CITRATE 50 UG/ML
INJECTION, SOLUTION INTRAMUSCULAR; INTRAVENOUS
Status: COMPLETED
Start: 2024-07-01 | End: 2024-07-01

## 2024-07-01 RX ADMIN — HYDROCODONE BITARTRATE AND ACETAMINOPHEN 1 TABLET: 5; 325 TABLET ORAL at 12:36

## 2024-07-01 RX ADMIN — FENTANYL CITRATE 50 MCG: 50 INJECTION INTRAMUSCULAR; INTRAVENOUS at 10:34

## 2024-07-01 RX ADMIN — SUGAMMADEX 200 MG: 100 INJECTION, SOLUTION INTRAVENOUS at 10:42

## 2024-07-01 RX ADMIN — FENTANYL CITRATE 50 MCG: 50 INJECTION INTRAMUSCULAR; INTRAVENOUS at 10:24

## 2024-07-01 RX ADMIN — LIDOCAINE HYDROCHLORIDE 30 MG: 20 INJECTION, SOLUTION INFILTRATION; PERINEURAL at 10:08

## 2024-07-01 RX ADMIN — DEXAMETHASONE SODIUM PHOSPHATE 5 MG: 4 INJECTION, SOLUTION INTRA-ARTICULAR; INTRALESIONAL; INTRAMUSCULAR; INTRAVENOUS; SOFT TISSUE at 10:15

## 2024-07-01 RX ADMIN — PROPOFOL 200 MG: 10 INJECTION, EMULSION INTRAVENOUS at 10:08

## 2024-07-01 RX ADMIN — METRONIDAZOLE 500 MG: 500 INJECTION, SOLUTION INTRAVENOUS at 08:12

## 2024-07-01 RX ADMIN — ONDANSETRON 4 MG: 2 INJECTION INTRAMUSCULAR; INTRAVENOUS at 10:15

## 2024-07-01 RX ADMIN — SUGAMMADEX 200 MG: 100 INJECTION, SOLUTION INTRAVENOUS at 11:09

## 2024-07-01 RX ADMIN — ROCURONIUM BROMIDE 30 MG: 10 INJECTION INTRAVENOUS at 10:08

## 2024-07-01 RX ADMIN — ROCURONIUM BROMIDE 20 MG: 10 INJECTION INTRAVENOUS at 10:29

## 2024-07-01 RX ADMIN — KETOROLAC TROMETHAMINE 30 MG: 30 INJECTION, SOLUTION INTRAMUSCULAR at 13:26

## 2024-07-01 RX ADMIN — DEXMEDETOMIDINE HYDROCHLORIDE 8 MCG: 4 INJECTION, SOLUTION INTRAVENOUS at 10:08

## 2024-07-01 RX ADMIN — PROPOFOL 20 MG: 10 INJECTION, EMULSION INTRAVENOUS at 10:12

## 2024-07-01 RX ADMIN — DEXMEDETOMIDINE HYDROCHLORIDE 4 MCG: 4 INJECTION, SOLUTION INTRAVENOUS at 10:24

## 2024-07-01 RX ADMIN — FENTANYL CITRATE 25 MCG: 50 INJECTION INTRAMUSCULAR; INTRAVENOUS at 11:32

## 2024-07-01 RX ADMIN — DEXMEDETOMIDINE HYDROCHLORIDE 4 MCG: 4 INJECTION, SOLUTION INTRAVENOUS at 10:15

## 2024-07-01 RX ADMIN — MIDAZOLAM 2 MG: 1 INJECTION INTRAMUSCULAR; INTRAVENOUS at 10:05

## 2024-07-01 RX ADMIN — DEXMEDETOMIDINE HYDROCHLORIDE 4 MCG: 4 INJECTION, SOLUTION INTRAVENOUS at 10:33

## 2024-07-01 RX ADMIN — FENTANYL CITRATE 25 MCG: 50 INJECTION, SOLUTION INTRAMUSCULAR; INTRAVENOUS at 11:56

## 2024-07-01 RX ADMIN — SODIUM CHLORIDE, POTASSIUM CHLORIDE, SODIUM LACTATE AND CALCIUM CHLORIDE: 600; 310; 30; 20 INJECTION, SOLUTION INTRAVENOUS at 08:11

## 2024-07-01 RX ADMIN — FENTANYL CITRATE 50 MCG: 50 INJECTION INTRAMUSCULAR; INTRAVENOUS at 10:08

## 2024-07-01 RX ADMIN — Medication 2 G: at 10:03

## 2024-07-01 ASSESSMENT — ACTIVITIES OF DAILY LIVING (ADL)
ADLS_ACUITY_SCORE: 18

## 2024-07-01 NOTE — OP NOTE
REPORT OF OPERATION  DATE OF PROCEDURE: 7/1/2024    PATIENT: Rylee J Huttel    SURGERY PERFORMED: Laparoscopic Cholecystectomy    PREOPERATIVE DIAGNOSIS: Symptomoatic Cholelithiasis    POSTOPERATIVE DIAGNOSIS: Cholelithiasis    SURGEON: Titi Arreguin MD    ASSISTANTS: None    ANESTHESIA: Monitored Anesthesia Care    COMPLICATIONS: None apparent    ESTIMATED BLOOD LOSS: 10 mL    TRANSFUSIONS: None    TISSUE TO PATHOLOGY: Gallbladder and contents to Pathology for pathological diagnosis    FINDINGS: Cholelithiasis    INDICATIONS: This is a 19 year old female with Symptomoatic Cholelithiasis.  The patient will be taken to the operative suite for a laparoscopic possible open cholecystectomy.    DESCRIPTIONS OF PROCEDURE IN DETAIL: After consent was obtained the patient was taken to the operative suite and dionne in the supine position.  The patient was identified and the correct patient was confirmed.  General endotracheal anesthesia was induced by anesthesia.  The patient was sterilely prepped and draped in the usual fashion.  A time out was performed verifying the correct patient and the correct procedure.  The entire operative team was in agreement.  All necessary equipment and supplies were in the room.    Through a supraumbilical incision, the skin was sharply entered and dissection was carried down until isolation of the fascia.  Via Niru technique, two stay sutures of 0 Vicryl were placed and the fascia was incised.  Entrance into the abdomen was accomplished.  A 10mm blunt-ended trocar was then inserted into the abdomen and pneumoperitoneum was obtained.  The laparoscope was inserted through the trocar and verification of no intraabdominal trauma was made.  Under direct visualization two 5mm right lateral trocars and a 5mm subxiphoid trocar were placed with verification of no intraabdominal trauma.  The fundus of the gallbladder was then identified, isolated and grasped with an atraumatic grasper and  retracted cephalad.  Latasha's pouch was then identified, isolated and grasped with an atraumatic grasper and retracted laterally and inferiorly opening the Trangle of [place]ot.  Dissection in the [place] of [place] yielded the cystic duct which was divided between clips.  The cystic artery was then identified and divided between clips.  The gallbladder was removed from the gallbladder fossa using Bovie electrocautery.   The gallbladder was removed from the abdomen and sent to Pathology for pathological diagnoses.  Hemostasis was assured.  All instrumentation was removed.  Sponge, needle and instrument counts were correct.  The supraumbilical trocars sites fascia was closed with a figure-of-eight of 0 Vicryl.  All skin incisions were closed with stainless steel staples.  Sterile dressings were applied.  The patient was awakened in the operative suite and extubated without difficulty and taken to the recovery room in stable condition tolerating the procedure well

## 2024-07-01 NOTE — ANESTHESIA POSTPROCEDURE EVALUATION
Patient: Rylee J Huttel    Procedure: Procedure(s):  CHOLECYSTECTOMY, LAPAROSCOPIC       Anesthesia Type:  General    Note:  Disposition: Outpatient   Postop Pain Control: Uneventful            Sign Out: Well controlled pain   PONV: No   Neuro/Psych: Uneventful            Sign Out: Acceptable/Baseline neuro status   Airway/Respiratory: Uneventful            Sign Out: Acceptable/Baseline resp. status   CV/Hemodynamics: Uneventful            Sign Out: Acceptable CV status; No obvious hypovolemia; No obvious fluid overload   Other NRE: NONE   DID A NON-ROUTINE EVENT OCCUR? No           Last vitals:  Vitals Value Taken Time   /68 07/01/24 1215   Temp 97.8  F (36.6  C) 07/01/24 1215   Pulse 71 07/01/24 1215   Resp 17 07/01/24 1215   SpO2 99 % 07/01/24 1216   Vitals shown include unfiled device data.    Electronically Signed By: KAITLIN GONZALES CRNA  July 1, 2024  3:42 PM

## 2024-07-01 NOTE — OR NURSING
Patient and responsible adult given discharge instructions with no questions regarding instructions. Papi score 19/20 medicated with IV Toradol and po [place] prior to discharge. Pain level 2/10.  Discharged from unit via wheelchair. Patient discharged to home with grandmother.

## 2024-07-01 NOTE — ANESTHESIA CARE TRANSFER NOTE
Patient: Rylee J Huttel    Procedure: Procedure(s):  CHOLECYSTECTOMY, LAPAROSCOPIC       Diagnosis: Cholelithiases [K80.20]  Diagnosis Additional Information: No value filed.    Anesthesia Type:   General     Note:      Level of Consciousness: drowsy  Oxygen Supplementation: face mask  Level of Supplemental Oxygen (L/min / FiO2): 5  Independent Airway: airway patency satisfactory and stable  Dentition: dentition unchanged  Vital Signs Stable: post-procedure vital signs reviewed and stable  Report to RN Given: handoff report given  Patient transferred to: PACU    Handoff Report: Identifed the Patient, Identified the Reponsible Provider, Reviewed the pertinent medical history, Discussed the surgical course, Reviewed Intra-OP anesthesia mangement and issues during anesthesia, Set expectations for post-procedure period and Allowed opportunity for questions and acknowledgement of understanding      Vitals:  Vitals Value Taken Time   /56 07/01/24 1112   Temp     Pulse 84 07/01/24 1114   Resp 9 07/01/24 1114   SpO2 100 % 07/01/24 1114   Vitals shown include unfiled device data.    Electronically Signed By: KAITLIN Roper CRNA  July 1, 2024  11:15 AM

## 2024-07-01 NOTE — ANESTHESIA PROCEDURE NOTES
Airway       Patient location during procedure: OR       Procedure Start/Stop Times: 7/1/2024 10:10 AM  Staff -        CRNA: Antonietta Garcia APRN CRNA       Performed By: CRNA  Consent for Airway        Urgency: elective  Indications and Patient Condition       Indications for airway management: bro-procedural        Final Airway Details       Final airway type: endotracheal airway       Successful airway: ETT - single  Endotracheal Airway Details        ETT size (mm): 7.0       Cuffed: yes       Successful intubation technique: direct laryngoscopy       DL Blade Type: Flowers 2       Grade View of Cords: 1       Adjucts: stylet       Position: Right       Measured from: lips       Secured at (cm): 21       Bite block used: None    Post intubation assessment        Placement verified by: capnometry, equal breath sounds and chest rise        Number of attempts at approach: 2       Secured with: tape       Ease of procedure: easy       Dentition: Intact    Medication(s) Administered   Medication Administration Time: 7/1/2024 10:10 AM

## 2024-07-03 LAB
PATH REPORT.COMMENTS IMP SPEC: NORMAL
PATH REPORT.FINAL DX SPEC: NORMAL
PATH REPORT.GROSS SPEC: NORMAL
PATH REPORT.MICROSCOPIC SPEC OTHER STN: NORMAL
PATH REPORT.RELEVANT HX SPEC: NORMAL
PHOTO IMAGE: NORMAL

## 2024-07-09 ENCOUNTER — OFFICE VISIT (OUTPATIENT)
Dept: FAMILY MEDICINE | Facility: OTHER | Age: 19
End: 2024-07-09
Attending: STUDENT IN AN ORGANIZED HEALTH CARE EDUCATION/TRAINING PROGRAM
Payer: MEDICAID

## 2024-07-09 VITALS
HEIGHT: 67 IN | TEMPERATURE: 98.2 F | HEART RATE: 98 BPM | BODY MASS INDEX: 25.11 KG/M2 | WEIGHT: 160 LBS | SYSTOLIC BLOOD PRESSURE: 118 MMHG | OXYGEN SATURATION: 98 % | RESPIRATION RATE: 16 BRPM | DIASTOLIC BLOOD PRESSURE: 68 MMHG

## 2024-07-09 DIAGNOSIS — F41.8 MIXED ANXIETY AND DEPRESSIVE DISORDER: ICD-10-CM

## 2024-07-09 DIAGNOSIS — F40.01 PANIC DISORDER WITH AGORAPHOBIA: Primary | ICD-10-CM

## 2024-07-09 DIAGNOSIS — F43.10 PTSD (POST-TRAUMATIC STRESS DISORDER): ICD-10-CM

## 2024-07-09 PROCEDURE — 99214 OFFICE O/P EST MOD 30 MIN: CPT | Performed by: STUDENT IN AN ORGANIZED HEALTH CARE EDUCATION/TRAINING PROGRAM

## 2024-07-09 PROCEDURE — G0463 HOSPITAL OUTPT CLINIC VISIT: HCPCS

## 2024-07-09 ASSESSMENT — ANXIETY QUESTIONNAIRES
GAD7 TOTAL SCORE: 4
GAD7 TOTAL SCORE: 4
3. WORRYING TOO MUCH ABOUT DIFFERENT THINGS: NOT AT ALL
1. FEELING NERVOUS, ANXIOUS, OR ON EDGE: MORE THAN HALF THE DAYS
2. NOT BEING ABLE TO STOP OR CONTROL WORRYING: SEVERAL DAYS
4. TROUBLE RELAXING: SEVERAL DAYS
6. BECOMING EASILY ANNOYED OR IRRITABLE: NOT AT ALL
IF YOU CHECKED OFF ANY PROBLEMS ON THIS QUESTIONNAIRE, HOW DIFFICULT HAVE THESE PROBLEMS MADE IT FOR YOU TO DO YOUR WORK, TAKE CARE OF THINGS AT HOME, OR GET ALONG WITH OTHER PEOPLE: SOMEWHAT DIFFICULT
8. IF YOU CHECKED OFF ANY PROBLEMS, HOW DIFFICULT HAVE THESE MADE IT FOR YOU TO DO YOUR WORK, TAKE CARE OF THINGS AT HOME, OR GET ALONG WITH OTHER PEOPLE?: SOMEWHAT DIFFICULT
GAD7 TOTAL SCORE: 4
7. FEELING AFRAID AS IF SOMETHING AWFUL MIGHT HAPPEN: NOT AT ALL
7. FEELING AFRAID AS IF SOMETHING AWFUL MIGHT HAPPEN: NOT AT ALL
5. BEING SO RESTLESS THAT IT IS HARD TO SIT STILL: NOT AT ALL

## 2024-07-09 ASSESSMENT — PATIENT HEALTH QUESTIONNAIRE - PHQ9
SUM OF ALL RESPONSES TO PHQ QUESTIONS 1-9: 4
SUM OF ALL RESPONSES TO PHQ QUESTIONS 1-9: 4
10. IF YOU CHECKED OFF ANY PROBLEMS, HOW DIFFICULT HAVE THESE PROBLEMS MADE IT FOR YOU TO DO YOUR WORK, TAKE CARE OF THINGS AT HOME, OR GET ALONG WITH OTHER PEOPLE: NOT DIFFICULT AT ALL

## 2024-07-09 ASSESSMENT — ENCOUNTER SYMPTOMS: NERVOUS/ANXIOUS: 1

## 2024-07-09 ASSESSMENT — PAIN SCALES - GENERAL: PAINLEVEL: NO PAIN (0)

## 2024-07-09 NOTE — PATIENT INSTRUCTIONS
Psychologists/ Counselors                        Angelique Carolina          ...            ..[phone]  [facility]                    ..  [phone]  [facility]                 .[phone]  [facility] (kids)       .         [phone]  [facility](teens)                ..[phone]  [facility]                    [phone]  [facility]                   [phone]  [facility]           ...      .. [phone]  [facility]                ...[phone]  [facility]     ...          ...[phone]  [facility]               [phone]   [facility]..            [phone]  [facility]                       [phone]  [facility]               .    [phone]  [facility]                ..[phone]  [facility]     .      . ..[phone]     [facility]              .   [phone]  [facility]                   [phone]              [facility]              .   [phone]  [facility]              .  ...[phone]  [facility]              .   ..[phone]  [facility]           ...      .. [phone]  [facility]              . ..[phone]  [facility]              .    ..[phone]  [facility]              .     .. [phone]  [facility]                 ... [phone]  [facility]                         .[phone]  [facility]     .      . ..[phone]            [facility]              ..  [phone]                   [facility]             . ... ..[phone]  [facility]              .      ..[phone]  [facility]              .     ..[phone]  [facility]        .      . [phone]  [facility]           .[phone]  [facility]         .    ..[phone]  [facility]             . ..  ..  [phone]  [facility]            [phone]  [facility]              ..[phone]  [facility]                     [phone]    [facility]           ..     .[phone]     [facility]    ...     ...[phone]     [facility]            [phone]                                                  [place]  Colby De León                ..  .  [phone]  [facility]         .     .  [phone]  [facility] ADALID Pruitt              . [phone]  [facility]  ..        [phone]  [facility]              .    [phone]    [facility]               ...  .  [phone]  [facility]             . [phone]  [facility]            [phone]  [facility]     .      . ..[phone]         *Facilities in bold italics indicate medication management  Services are offered.     Crisis support    If you or someone you know is struggling or in crisis, help is available. Call or text 260 or chat [email or web address]    The volunteer Crisis Counselor will help you move from a 'hot moment to a cool moment'

## 2024-07-09 NOTE — PROGRESS NOTES
"  Assessment & Plan     Panic disorder with agoraphobia  Improving.  May benefit from selective serotonin reuptake inhibitor but she is not open to this at this time  - Adult Mental Health  Referral; Future    PTSD (post-traumatic stress disorder)  History of significant emotional childhood trauma.  Needs to work through this with mental health practicioner and she is open to this  Referral placed.  - Adult Mental Health  Referral; Future    Mixed anxiety and depressive disorder  Not well controlled.  Patient declines medications at this time.  She is concerned about weight gain related to depression medications as her aunt reportedly gained a lot of weight.  Reviewed the option of more weight neutral medications such as sertraline, fluoxetine or buproprion.  She tells me if I prescribed an selective serotonin reuptake inhibitor, she is most likely not going to take it but she is willing to talk to a counselor  Some self- injurious behavior  Endorses passive SI but no plan  Has been in online relationships with people she has never met before.  Extensive conservation about the dangers of this.  Encouraged to get out more and meet people face to face.  - Adult Mental Health  Referral; Future          BMI  Estimated body mass index is 25.06 kg/m  as calculated from the following:    Height as of this encounter: 1.702 m (5' 7\").    Weight as of this encounter: 72.6 kg (160 lb).         No follow-ups on file.    Subjective Rylee is a 19 year old, presenting for the following health issues:  Anxiety and Depression      7/9/2024     8:49 AM   Additional Questions   Roomed by April   Accompanied by Grandma         7/9/2024     8:49 AM   Patient Reported Additional Medications   Patient reports taking the following new medications none     Anxiety    History of Present Illness       Reason for visit:  Checkup    She eats 0-1 servings of fruits and vegetables daily.She consumes 0 sweetened " beverage(s) daily.She exercises with enough effort to increase her heart rate 9 or less minutes per day.  She exercises with enough effort to increase her heart rate 3 or less days per week.   She is taking medications regularly.     People make her mad and she doesn't know how to control her anger.    Had online relationship with person.  Has been going out and going to the store.    Has been on social media and meeting people online and engaging in exclusive online relationships  She tells me she is in new online relationship now.    Depression and Anxiety   How are you doing with your depression since your last visit? No change  How are you doing with your anxiety since your last visit?  Improved   Are you having other symptoms that might be associated with depression or anxiety? No  Have you had a significant life event? No   Do you have any concerns with your use of alcohol or other drugs? No    Social History     Tobacco Use    Smoking status: Former     Types: Cigarettes    Smokeless tobacco: Never   Vaping Use    Vaping status: Never Used   Substance Use Topics    Alcohol use: Not Currently    Drug use: Not Currently         6/5/2024     9:37 AM 7/9/2024     8:45 AM   PHQ   PHQ-9 Total Score 5 4   Q9: Thoughts of better off dead/self-harm past 2 weeks Not at all Not at all         6/5/2024     9:38 AM 7/9/2024     8:46 AM   LYNETTE-7 SCORE   Total Score 4 (minimal anxiety) 4 (minimal anxiety)   Total Score 4 4         7/9/2024     8:45 AM   Last PHQ-9   1.  Little interest or pleasure in doing things 1   2.  Feeling down, depressed, or hopeless 1   3.  Trouble falling or staying asleep, or sleeping too much 0   4.  Feeling tired or having little energy 2   5.  Poor appetite or overeating 0   6.  Feeling bad about yourself 0   7.  Trouble concentrating 0   8.  Moving slowly or restless 0   Q9: Thoughts of better off dead/self-harm past 2 weeks 0   PHQ-9 Total Score 4         7/9/2024     8:46 AM   LYNETTE-7    1.  "Feeling nervous, anxious, or on edge 2   2. Not being able to stop or control worrying 1   3. Worrying too much about different things 0   4. Trouble relaxing 1   5. Being so restless that it is hard to sit still 0   6. Becoming easily annoyed or irritable 0   7. Feeling afraid, as if something awful might happen 0   LYNETTE-7 Total Score 4   If you checked any problems, how difficult have they made it for you to do your work, take care of things at home, or get along with other people? Somewhat difficult       Suicide Assessment Five-step Evaluation and Treatment (SAFE-T)            Review of Systems  Constitutional, HEENT, cardiovascular, pulmonary, GI, , musculoskeletal, neuro, skin, endocrine and psych systems are negative, except as otherwise noted.      Objective    /68 (BP Location: Left arm, Patient Position: Sitting, Cuff Size: Adult Regular)   Pulse 98   Temp 98.2  F (36.8  C) (Tympanic)   Resp 16   Ht 1.702 m (5' 7\")   Wt 72.6 kg (160 lb)   LMP 06/28/2024 (Approximate)   SpO2 98%   BMI 25.06 kg/m    Body mass index is 25.06 kg/m .  Physical Exam   GENERAL: alert and no distress  EYES: Eyes grossly normal to inspection, PERRL and conjunctivae and sclerae normal  HENT: ear canals and TM's normal, nose and mouth without ulcers or lesions  NECK: no adenopathy, no asymmetry, masses, or scars  RESP: lungs clear to auscultation - no rales, rhonchi or wheezes  CV: regular rate and rhythm, normal S1 S2, no S3 or S4, no murmur, click or rub, no peripheral edema  ABDOMEN: soft, nontender, no hepatosplenomegaly, no masses and bowel sounds normal  MS: no gross musculoskeletal defects noted, no edema  SKIN: no suspicious lesions or rashes  NEURO: Normal strength and tone, mentation intact and speech normal  PSYCH: mentation appears normal, affect normal/bright            Signed Electronically by: Odalis Davis MD    "

## 2024-07-13 ENCOUNTER — HOSPITAL ENCOUNTER (EMERGENCY)
Facility: HOSPITAL | Age: 19
Discharge: HOME | End: 2024-07-13
Attending: PHYSICIAN ASSISTANT | Admitting: PHYSICIAN ASSISTANT
Payer: MEDICAID

## 2024-07-13 VITALS
RESPIRATION RATE: 20 BRPM | HEART RATE: 84 BPM | OXYGEN SATURATION: 97 % | SYSTOLIC BLOOD PRESSURE: 148 MMHG | DIASTOLIC BLOOD PRESSURE: 70 MMHG | TEMPERATURE: 98.6 F

## 2024-07-13 DIAGNOSIS — Z48.89 ENCOUNTER FOR POSTOPERATIVE WOUND CHECK: ICD-10-CM

## 2024-07-13 PROCEDURE — 99213 OFFICE O/P EST LOW 20 MIN: CPT | Performed by: PHYSICIAN ASSISTANT

## 2024-07-13 PROCEDURE — G0463 HOSPITAL OUTPT CLINIC VISIT: HCPCS

## 2024-07-13 ASSESSMENT — ENCOUNTER SYMPTOMS: ROS GI COMMENTS: SEE HPI

## 2024-07-13 NOTE — ED PROVIDER NOTES
History     Chief Complaint   Patient presents with    Suture Removal     HPI  Rylee J Huttel is a 19 year old female who presents to urgent care for staple removal.  On 2024 patient had a laparoscopic cholecystectomy performed.  Patient denies any abdominal pain, fevers chills but states that she would like to have her staples removed.  Patient is unsure when she is supposed to follow-up for her post op evaluation by general surgery.    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 See scanned visit from [facility] - 2015.  CTSS services, therapy in school      Bilateral vesicoureteral reflux 2011     Priority: Medium     Formatting of this note might be different from the original. [facility] IMPRESSION: 1. Grade IV left vesicoureteral reflux. 2. Grade III right vesicoureteral reflux. 3. Moderate post void residual in the bladder. IMO Update 10/11      Urinary incontinence 2010     Priority: Medium     Formatting of this note might be different from the original. Urology visit 16 - behavioral modifications, habit toileting.  If no improvement in 2 months - mom is to contact urology and will consider oxybutynin.  Also with constipation - recommended miralax cleanout, then maintainence      Exotropia 2009     Priority: Medium     Formatting of this note might be different from the original. IMO Update      Astigmatism 2009     Priority: Medium     Formatting of this note might be different from the original. IMO Update      Myopia 2009     Priority: Medium        Past Medical History:    No past medical history on file.    Past Surgical  History:    Past Surgical History:   Procedure Laterality Date    LAPAROSCOPIC CHOLECYSTECTOMY N/A 7/1/2024    Procedure: CHOLECYSTECTOMY, LAPAROSCOPIC;  Surgeon: Titi Arreguin MD;  Location: [facility] OR       Family History:    Family History   Problem Relation Age of Onset    Substance Abuse Mother     Diabetes Type 2  Maternal Grandmother     Peripheral Neuropathy Maternal Grandmother     Hypertension Maternal Grandfather     Heart Failure Maternal Grandfather        Social History:  Marital Status:  Single [1]  Social History     Tobacco Use    Smoking status: Former     Types: Cigarettes    Smokeless tobacco: Never   Vaping Use    Vaping status: Never Used   Substance Use Topics    Alcohol use: Not Currently    Drug use: Not Currently        Medications:    docusate sodium (COLACE) 100 MG capsule          Review of Systems   Gastrointestinal:         See HPI   All other systems reviewed and are negative.      Physical Exam   BP: 148/70  Pulse: 84  Temp: 98.6  F (37  C)  Resp: 20  SpO2: 97 %      Physical Exam  Vitals and nursing note reviewed.   Constitutional:       General: She is not in acute distress.     Appearance: Normal appearance. She is not ill-appearing or toxic-appearing.   Cardiovascular:      Rate and Rhythm: Regular rhythm.      Heart sounds: Normal heart sounds.   Pulmonary:      Breath sounds: Normal breath sounds.   Abdominal:          Comments: 4 separate healing incisions with staples present abdomen.  No sign of infection.  No tenderness with palpation of abdomen.   Neurological:      Mental Status: She is alert and oriented to person, place, and time.         ED Course        Procedures             Critical Care time:               No results found for this or any previous visit (from the past 24 hour(s)).    Medications - No data to display    Assessments & Plan (with Medical Decision Making)   #1.  Postop wound evaluation    Discussed exam findings with patient.  No sign of  postoperative incision infection.  I informed patient that she already has an appointment this coming Tuesday with general surgery for postop evaluation.  Any additional concerns in the meantime she can return to emergency department or follow-up with primary care provider.  Patient verbalized understanding and agreement to plan.        I have reviewed the nursing notes.    I have reviewed the findings, diagnosis, plan and need for follow up with the patient.                New Prescriptions    No medications on file       Final diagnoses:   Encounter for postoperative wound check       7/13/2024   [facility]       Javier Fuentes PA-C  07/13/24 1245

## 2024-07-13 NOTE — ED TRIAGE NOTES
Pt presents with c/o having surgery on the 1st of July and here to get staples taken out has multiple areas on the abdomen with staples   Pt does seen surgery on the 16th for an appt   No otc meds taken today

## 2024-07-16 ENCOUNTER — OFFICE VISIT (OUTPATIENT)
Dept: SURGERY | Facility: OTHER | Age: 19
End: 2024-07-16
Attending: NURSE PRACTITIONER
Payer: MEDICAID

## 2024-07-16 VITALS
TEMPERATURE: 97.7 F | HEART RATE: 102 BPM | RESPIRATION RATE: 18 BRPM | SYSTOLIC BLOOD PRESSURE: 116 MMHG | OXYGEN SATURATION: 99 % | DIASTOLIC BLOOD PRESSURE: 60 MMHG | BODY MASS INDEX: 25.11 KG/M2 | WEIGHT: 160 LBS | HEIGHT: 67 IN

## 2024-07-16 DIAGNOSIS — Z90.49 STATUS POST LAPAROSCOPIC CHOLECYSTECTOMY: Primary | ICD-10-CM

## 2024-07-16 PROCEDURE — G0463 HOSPITAL OUTPT CLINIC VISIT: HCPCS

## 2024-07-16 PROCEDURE — 99024 POSTOP FOLLOW-UP VISIT: CPT | Performed by: NURSE PRACTITIONER

## 2024-07-16 ASSESSMENT — PAIN SCALES - GENERAL: PAINLEVEL: NO PAIN (0)

## 2024-07-16 NOTE — PROGRESS NOTES
"CLINIC NOTE - POST-OP SURGERY  7/16/2024    Patient:Rylee J Huttel    Procedure: Laparoscopic Cholecystectomy    This is a 19 year old female who is 2 weeks s/p Laparoscopic Cholecystectomy.  The patient has no complaints today.     Current Medications:  Current Outpatient Medications   Medication Sig Dispense Refill    docusate sodium (COLACE) 100 MG capsule Take 1 capsule (100 mg) by mouth 2 times daily 60 capsule 1       Allergies:  No Known Allergies    PHYSICAL EXAM:   Vital signs: /60 (BP Location: Left arm, Cuff Size: Adult Regular)   Pulse 102   Temp 97.7  F (36.5  C) (Tympanic)   Resp 18   Ht 1.702 m (5' 7\")   Wt 72.6 kg (160 lb)   LMP 06/28/2024 (Approximate)   SpO2 99%   BMI 25.06 kg/m     BMI: Body mass index is 25.06 kg/m .   General: Normal, healthy, cooperative, in no acute distress, alert   Lungs: respirations are non-labored   Abdominal: non-distended   Wounds:  Well healed surgical scars consistent with her operation.     PATHOLOGY:  Case Report   Date Value Ref Range Status   07/01/2024   Final    Surgical Pathology Report                         Case: [id number]                                  Authorizing Provider:  Titi Arreguin MD  Collected:           07/01/2024 10:39 AM          Ordering Location:     [facility] Main Operating Room     Received:            07/01/2024 01:36 PM          Pathologist:           Jules Krishnan DO                                                         Specimen:    Gallbladder, gallbladder and contents                                                       Final Diagnosis   Date Value Ref Range Status   07/01/2024   Final    A.  Gallbladder, cholecystectomy:  -Diffuse cholesterolosis.  -Cholelithiasis.          ASSESSMENT:    19 year old female who is 2 weeks s/p Laparoscopic Cholecystectomy.  Doing well.     PLAN:   Staples were removed without problems    Follow-up as needed      Restrictions : Will continue lifting restrictions of no more than " 10 pounds until 6 weeks after surgery

## 2024-07-19 ENCOUNTER — OFFICE VISIT (OUTPATIENT)
Dept: PSYCHOLOGY | Facility: OTHER | Age: 19
End: 2024-07-19
Attending: MARRIAGE & FAMILY THERAPIST
Payer: MEDICAID

## 2024-07-19 DIAGNOSIS — F43.10 PTSD (POST-TRAUMATIC STRESS DISORDER): ICD-10-CM

## 2024-07-19 DIAGNOSIS — F41.1 GENERALIZED ANXIETY DISORDER: ICD-10-CM

## 2024-07-19 DIAGNOSIS — F40.01 PANIC DISORDER WITH AGORAPHOBIA: ICD-10-CM

## 2024-07-19 PROCEDURE — 90791 PSYCH DIAGNOSTIC EVALUATION: CPT | Performed by: MARRIAGE & FAMILY THERAPIST

## 2024-07-19 NOTE — PROGRESS NOTES
[facility]   Mental Health & Addiction Services     Progress Note - Initial Visit    Patient  Name:  Rylee J Huttel Date: 7/19/24         Service Type: Individual     Visit Start Time: 1030  Visit End Time: 1145      Visit #: 1    Attendees: Client and Grandmother    Service Modality:  In-person       DATA:   Interactive Complexity: No   Crisis: No     Presenting Concerns/  Current Stressors:   Quiet reluctant to leave house      ASSESSMENT:  Mental Status Assessment:  Appearance:   Appropriate   Eye Contact:   Good   Psychomotor Behavior: Retarded (Slowed)   Attitude:   Friendly Guarded  Indifferent  Orientation:   All  Speech   Rate / Production: Impoverished  Slow    Volume:  Soft   Mood:    Anxious  Depressed   Affect:    Subdued   Thought Content:  Poverty of thought  Thought Form:  Circumstantial  Insight:    Poor     Assessments completed prior to this visit:  The following assessments were completed by patient for this visit:  PHQ9:       6/5/2024     9:37 AM 7/9/2024     8:45 AM   PHQ-9 SCORE   PHQ-9 Total Score MyChart 5 (Mild depression) 4 (Minimal depression)   PHQ-9 Total Score 5 4     GAD7:       6/5/2024     9:38 AM 7/9/2024     8:46 AM   LYNETTE-7 SCORE   Total Score 4 (minimal anxiety) 4 (minimal anxiety)   Total Score 4 4         Safety Issues and Plan for Safety and Risk Management:   [place] Suicide Severity Rating Scale (Short Version)      3/18/2024     6:25 PM 3/18/2024     6:46 PM 3/24/2024     9:09 PM 5/7/2024     8:02 PM 5/20/2024     4:59 PM 7/1/2024     7:44 AM 7/13/2024    12:16 PM   [place] Suicide Severity Rating (Short Version)   Q1 Wished to be Dead (Past Month) 0-->no 0-->no 0-->no 0-->no 0-->no 0-->no 0-->no   Q2 Suicidal Thoughts (Past Month) 0-->no 0-->no 0-->no 0-->no 0-->no 0-->no 0-->no   Q6 Suicide Behavior (Lifetime)  0-->no 0-->no 0-->no 0-->no 0-->no 0-->no   Level of Risk per Screen no risks indicated no risks indicated no risks indicated no risks  indicated no risks indicated no risks indicated no risks indicated     Patient denies current fears or concerns for personal safety.  Patient denies current or recent suicidal ideation or behaviors.  Patient denies current or recent homicidal ideation or behaviors.  Patient denies current or recent self injurious behavior or ideation.  Patient denies other safety concerns.  Recommended that patient call 911 or go to the local ED should there be a change in any of these risk factors.  Patient reports there are  uncertain at this time .     Diagnostic Criteria:  Unspecified Anxiety Disorder  Mixed anxiety-depressive disorder: clinically significant symptoms of anxiety and depression, but the criteria are not met for either a specific Mood Disorder or a specific Anxiety Disorder.      DSM5 Diagnoses: (Sustained by DSM5 Criteria Listed Above)  Diagnoses: 300.02 (F41.1) Generalized Anxiety Disorder  Psychosocial & Contextual Factors: Mother has CD problem  Intervention:   Educated on treatment planning and started identifying goals and interventions for treatment plan  Collateral Reports Completed:  Not Applicable      PLAN: (Homework, other):  1. Provider will continue Diagnostic Assessment.  Patient was given the following to do until next session:  Fill out initial intake forms    2. Provider recommended the following referrals: none.      3.  Suicide Risk and Safety Concerns were assessed for Rylee J Huttel.    Patient meets the following risk assessment and triage: Patient denied any current/recent/lifetime history of suicidal ideation and/or behaviors.  No safety plan indicated at this time.       AMAN Martines  July 19, 2024

## 2024-09-15 ENCOUNTER — HOSPITAL ENCOUNTER (EMERGENCY)
Facility: HOSPITAL | Age: 19
Discharge: HOME | End: 2024-09-15
Attending: NURSE PRACTITIONER | Admitting: NURSE PRACTITIONER
Payer: COMMERCIAL

## 2024-09-15 ENCOUNTER — APPOINTMENT (OUTPATIENT)
Dept: GENERAL RADIOLOGY | Facility: HOSPITAL | Age: 19
End: 2024-09-15
Attending: NURSE PRACTITIONER
Payer: COMMERCIAL

## 2024-09-15 VITALS
OXYGEN SATURATION: 100 % | TEMPERATURE: 98 F | DIASTOLIC BLOOD PRESSURE: 70 MMHG | HEART RATE: 66 BPM | RESPIRATION RATE: 18 BRPM | SYSTOLIC BLOOD PRESSURE: 113 MMHG
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
## 2024-09-15 DIAGNOSIS — M79.632 PAIN OF LEFT FOREARM: ICD-10-CM

## 2024-09-15 PROCEDURE — 73090 X-RAY EXAM OF FOREARM: CPT | Mod: LT

## 2024-09-15 PROCEDURE — G0463 HOSPITAL OUTPT CLINIC VISIT: HCPCS

## 2024-09-15 PROCEDURE — 99213 OFFICE O/P EST LOW 20 MIN: CPT | Performed by: NURSE PRACTITIONER

## 2024-09-15 PROCEDURE — 73110 X-RAY EXAM OF WRIST: CPT | Mod: LT

## 2024-09-15 PROCEDURE — 250N000013 HC RX MED GY IP 250 OP 250 PS 637: Performed by: NURSE PRACTITIONER

## 2024-09-15 RX ORDER — IBUPROFEN 600 MG/1
600 TABLET, FILM COATED ORAL ONCE
Status: COMPLETED | OUTPATIENT
Start: 2024-09-15 | End: 2024-09-15

## 2024-09-15 RX ADMIN — IBUPROFEN 600 MG: 600 TABLET, FILM COATED ORAL at 17:16

## 2024-09-15 ASSESSMENT — ENCOUNTER SYMPTOMS: ARTHRALGIAS: 1

## 2024-09-15 ASSESSMENT — ACTIVITIES OF DAILY LIVING (ADL)
ADLS_ACUITY_SCORE: 35
ADLS_ACUITY_SCORE: 35
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
## 2024-09-15 NOTE — ED TRIAGE NOTES
YENI Dias CNP assessed patient in triage and determined patient Urgent Care appropriate. Will be seen in Urgent Care.

## 2024-09-15 NOTE — DISCHARGE INSTRUCTIONS
Your x-rays did not show anything that is fractured or dislocated.  Use the Ace wrap to your arm and wrist.  Apply ice packs for 20 minutes at a time.  Tylenol or ibuprofen as needed for pain.    Follow-up with your primary doctor in 1 week if no improvement in symptoms.  Return to urgent care or emergency room for any worsening or concerning symptoms.

## 2024-11-22 ENCOUNTER — ANCILLARY PROCEDURE (OUTPATIENT)
Dept: GENERAL RADIOLOGY | Facility: OTHER | Age: 19
End: 2024-11-22
Attending: STUDENT IN AN ORGANIZED HEALTH CARE EDUCATION/TRAINING PROGRAM
Payer: COMMERCIAL

## 2024-11-22 DIAGNOSIS — R05.3 PERSISTENT COUGH FOR 3 WEEKS OR LONGER: ICD-10-CM

## 2024-11-22 PROCEDURE — 71046 X-RAY EXAM CHEST 2 VIEWS: CPT | Mod: TC

## 2024-12-02 ENCOUNTER — MYC MEDICAL ADVICE (OUTPATIENT)
Dept: FAMILY MEDICINE | Facility: OTHER | Age: 19
End: 2024-12-02

## 2025-02-16 ENCOUNTER — HEALTH MAINTENANCE LETTER (OUTPATIENT)
Age: 20
End: 2025-02-16

## 2025-03-24 ASSESSMENT — ANXIETY QUESTIONNAIRES
3. WORRYING TOO MUCH ABOUT DIFFERENT THINGS: SEVERAL DAYS
7. FEELING AFRAID AS IF SOMETHING AWFUL MIGHT HAPPEN: SEVERAL DAYS
2. NOT BEING ABLE TO STOP OR CONTROL WORRYING: SEVERAL DAYS
5. BEING SO RESTLESS THAT IT IS HARD TO SIT STILL: SEVERAL DAYS
GAD7 TOTAL SCORE: 8
1. FEELING NERVOUS, ANXIOUS, OR ON EDGE: SEVERAL DAYS
IF YOU CHECKED OFF ANY PROBLEMS ON THIS QUESTIONNAIRE, HOW DIFFICULT HAVE THESE PROBLEMS MADE IT FOR YOU TO DO YOUR WORK, TAKE CARE OF THINGS AT HOME, OR GET ALONG WITH OTHER PEOPLE: SOMEWHAT DIFFICULT
4. TROUBLE RELAXING: SEVERAL DAYS
8. IF YOU CHECKED OFF ANY PROBLEMS, HOW DIFFICULT HAVE THESE MADE IT FOR YOU TO DO YOUR WORK, TAKE CARE OF THINGS AT HOME, OR GET ALONG WITH OTHER PEOPLE?: SOMEWHAT DIFFICULT
7. FEELING AFRAID AS IF SOMETHING AWFUL MIGHT HAPPEN: SEVERAL DAYS
6. BECOMING EASILY ANNOYED OR IRRITABLE: MORE THAN HALF THE DAYS
GAD7 TOTAL SCORE: 8
GAD7 TOTAL SCORE: 8

## 2025-03-26 ENCOUNTER — OFFICE VISIT (OUTPATIENT)
Dept: FAMILY MEDICINE | Facility: OTHER | Age: 20
End: 2025-03-26
Attending: STUDENT IN AN ORGANIZED HEALTH CARE EDUCATION/TRAINING PROGRAM
Payer: COMMERCIAL

## 2025-03-26 VITALS
DIASTOLIC BLOOD PRESSURE: 79 MMHG | SYSTOLIC BLOOD PRESSURE: 126 MMHG | BODY MASS INDEX: 27.53 KG/M2 | RESPIRATION RATE: 16 BRPM | WEIGHT: 175.38 LBS | OXYGEN SATURATION: 98 % | TEMPERATURE: 98.1 F | HEIGHT: 67 IN | HEART RATE: 77 BPM

## 2025-03-26 DIAGNOSIS — F41.8 MIXED ANXIETY AND DEPRESSIVE DISORDER: Primary | ICD-10-CM

## 2025-03-26 PROCEDURE — G0463 HOSPITAL OUTPT CLINIC VISIT: HCPCS

## 2025-03-26 ASSESSMENT — PATIENT HEALTH QUESTIONNAIRE - PHQ9: SUM OF ALL RESPONSES TO PHQ QUESTIONS 1-9: 15

## 2025-03-26 ASSESSMENT — ENCOUNTER SYMPTOMS: NERVOUS/ANXIOUS: 1

## 2025-03-26 ASSESSMENT — PAIN SCALES - GENERAL: PAINLEVEL_OUTOF10: NO PAIN (0)

## 2025-03-26 NOTE — PROGRESS NOTES
Assessment & Plan     Mixed anxiety and depressive disorder  Stopped zoloft after 3 weeks as it was making her feel very tired, doesn't feel that it was otherwise helping her clinically.  Some intrusive passive suicidal thoughts the past month, nothing she was seriously contemplating or that she would act on   Will switch to more activating SSRI, fluoxetine.  BB warning reviewed  Will start at 20 mg and have her RTC in 4 weeks for follow-up  - FLUoxetine (PROZAC) 20 MG capsule; Take 1 capsule (20 mg) by mouth daily.  - Adult Mental Health  Referral; Future              No follow-ups on file.    Subjective Rylee is a 19 year old, presenting for the following health issues:  Anxiety and Depression        3/26/2025     1:51 PM   Additional Questions   Roomed by Shelbi Wellington   Accompanied by None         3/26/2025     1:51 PM   Patient Reported Additional Medications   Patient reports taking the following new medications None     Anxiety    History of Present Illness       Mental Health Follow-up:  Patient presents to follow-up on Depression & Anxiety.Patient's depression since last visit has been:  Medium  The patient is not having other symptoms associated with depression.  Patient's anxiety since last visit has been:  Medium  The patient is not having other symptoms associated with anxiety.  Any significant life events: No  Patient is feeling anxious or having panic attacks.  Patient has no concerns about alcohol or drug use.    Reason for visit:  Check up on medication    She eats 0-1 servings of fruits and vegetables daily.She consumes 2 sweetened beverage(s) daily.She exercises with enough effort to increase her heart rate 9 or less minutes per day.  She exercises with enough effort to increase her heart rate 5 days per week. She is missing 2 dose(s) of medications per week.  She is not taking prescribed medications regularly due to side effects and remembering to take.        Took zoloft 25 mg  for 3 weeks and then stopped because she felt really tired on this.  Felt like she would fall asleep in the middle of the day.    Talked to mom yesterday after she got out of surgery to have pacemaker placed.    Still living with grandmother.   In new relationship, still online.  Saying that online boyfriend will be coming to visit in May. He is currently residing in [place].  Never met him in person.    Was seeing Phill for a while but got uncomfortable.  Would prefer a female therapist.    Depression and Anxiety   How are you doing with your depression since your last visit? No change  How are you doing with your anxiety since your last visit?  Worsened   Are you having other symptoms that might be associated with depression or anxiety? No  Have you had a significant life event? No   Do you have any concerns with your use of alcohol or other drugs? No    Social History     Tobacco Use    Smoking status: Former     Types: Cigarettes    Smokeless tobacco: Never   Vaping Use    Vaping status: Never Used   Substance Use Topics    Alcohol use: Not Currently    Drug use: Not Currently         7/9/2024     8:45 AM 11/22/2024     9:24 AM 3/26/2025     1:52 PM   PHQ   PHQ-9 Total Score 4 16  15   Q9: Thoughts of better off dead/self-harm past 2 weeks Not at all Not at all Several days       Patient-reported         7/9/2024     8:46 AM 11/22/2024     9:24 AM 3/24/2025    12:45 PM   LYNETTE-7 SCORE   Total Score 4 (minimal anxiety) 10 (moderate anxiety) 8 (mild anxiety)   Total Score 4 10  8        Patient-reported         3/26/2025     1:52 PM   Last PHQ-9   1.  Little interest or pleasure in doing things 3   2.  Feeling down, depressed, or hopeless 1   3.  Trouble falling or staying asleep, or sleeping too much 1   4.  Feeling tired or having little energy 3   5.  Poor appetite or overeating 3   6.  Feeling bad about yourself 2   7.  Trouble concentrating 1   8.  Moving slowly or restless 0   Q9: Thoughts of better off  "dead/self-harm past 2 weeks 1   PHQ-9 Total Score 15   Difficulty at work, home, or with people Somewhat difficult         3/24/2025    12:45 PM   LYNETTE-7    1. Feeling nervous, anxious, or on edge 1   2. Not being able to stop or control worrying 1   3. Worrying too much about different things 1   4. Trouble relaxing 1   5. Being so restless that it is hard to sit still 1   6. Becoming easily annoyed or irritable 2   7. Feeling afraid, as if something awful might happen 1   LYNETTE-7 Total Score 8    If you checked any problems, how difficult have they made it for you to do your work, take care of things at home, or get along with other people? Somewhat difficult       Patient-reported           Review of Systems  Constitutional, HEENT, cardiovascular, pulmonary, GI, , musculoskeletal, neuro, skin, endocrine and psych systems are negative, except as otherwise noted.      Objective    /79 (BP Location: Left arm, Patient Position: Sitting, Cuff Size: Adult Regular)   Pulse 77   Temp 98.1  F (36.7  C) (Tympanic)   Resp 16   Ht 1.702 m (5' 7\")   Wt 79.5 kg (175 lb 6 oz)   SpO2 98%   BMI 27.47 kg/m    Body mass index is 27.47 kg/m .  Physical Exam   GENERAL: alert and no distress  EYES: Eyes grossly normal to inspection, PERRL and conjunctivae and sclerae normal  HENT: ear canals and TM's normal, nose and mouth without ulcers or lesions  NECK: no adenopathy, no asymmetry, masses, or scars  RESP: lungs clear to auscultation - no rales, rhonchi or wheezes  CV: regular rate and rhythm, normal S1 S2, no S3 or S4, no murmur, click or rub, no peripheral edema  ABDOMEN: soft, nontender, no hepatosplenomegaly, no masses and bowel sounds normal  MS: no gross musculoskeletal defects noted, no edema  SKIN: no suspicious lesions or rashes  NEURO: Normal strength and tone, mentation intact and speech normal  PSYCH: mentation appears normal, affect normal/bright          Signed Electronically by: Odalis Davis MD    "

## 2025-04-10 ENCOUNTER — OFFICE VISIT (OUTPATIENT)
Dept: PSYCHOLOGY | Facility: OTHER | Age: 20
End: 2025-04-10
Attending: COUNSELOR
Payer: COMMERCIAL

## 2025-04-10 DIAGNOSIS — F41.8 MIXED ANXIETY AND DEPRESSIVE DISORDER: ICD-10-CM

## 2025-04-10 PROCEDURE — 90834 PSYTX W PT 45 MINUTES: CPT | Performed by: COUNSELOR

## 2025-04-15 ENCOUNTER — HOSPITAL ENCOUNTER (EMERGENCY)
Facility: HOSPITAL | Age: 20
Discharge: HOME | End: 2025-04-15
Attending: PHYSICIAN ASSISTANT
Payer: COMMERCIAL

## 2025-04-15 VITALS
HEART RATE: 77 BPM | DIASTOLIC BLOOD PRESSURE: 87 MMHG | OXYGEN SATURATION: 99 % | WEIGHT: 175.93 LBS | TEMPERATURE: 98.5 F | BODY MASS INDEX: 27.55 KG/M2 | SYSTOLIC BLOOD PRESSURE: 146 MMHG | RESPIRATION RATE: 18 BRPM

## 2025-04-15 DIAGNOSIS — R07.0 THROAT DISCOMFORT: ICD-10-CM

## 2025-04-15 PROCEDURE — 99282 EMERGENCY DEPT VISIT SF MDM: CPT | Performed by: PHYSICIAN ASSISTANT

## 2025-04-15 PROCEDURE — 99282 EMERGENCY DEPT VISIT SF MDM: CPT

## 2025-04-15 ASSESSMENT — ACTIVITIES OF DAILY LIVING (ADL): ADLS_ACUITY_SCORE: 41

## 2025-04-15 ASSESSMENT — COLUMBIA-SUICIDE SEVERITY RATING SCALE - C-SSRS
1. IN THE PAST MONTH, HAVE YOU WISHED YOU WERE DEAD OR WISHED YOU COULD GO TO SLEEP AND NOT WAKE UP?: NO
2. HAVE YOU ACTUALLY HAD ANY THOUGHTS OF KILLING YOURSELF IN THE PAST MONTH?: NO
6. HAVE YOU EVER DONE ANYTHING, STARTED TO DO ANYTHING, OR PREPARED TO DO ANYTHING TO END YOUR LIFE?: NO

## 2025-04-15 NOTE — ED TRIAGE NOTES
Pt in for evaluation of throat discomfort intermittent over the last month. Pt reports she was referred by therapist for evaluation after recent visit. No difficulty swallowing. Does report sx are intermittent and can last from a few mins to an hour. Pt alert and oriented states she was initially attesting sx to her anxiety but now isn't sure.

## 2025-04-15 NOTE — PROGRESS NOTES
"                                           Progress Note    Client Name: Rylee J Huttel  Date: April 15, 2025         Service Type: consult      Session Start Time: 0200  Session End Time: 0250      Session Length: 50         Attendees: Client attended alone    PHQ-9 :       7/9/2024     8:45 AM 11/22/2024     9:24 AM 3/26/2025     1:52 PM   PHQ-9 SCORE   PHQ-9 Total Score MyChart 4 (Minimal depression) 16 (Moderately severe depression)    PHQ-9 Total Score 4 16  15       Patient-reported      LYNETTE-7 :        7/9/2024     8:46 AM 11/22/2024     9:24 AM 3/24/2025    12:45 PM   LYNETTE-7 SCORE   Total Score 4 (minimal anxiety) 10 (moderate anxiety) 8 (mild anxiety)   Total Score 4 10  8        Patient-reported           DATA    Rylee J Huttel presented as O x 4,coherent,relevant and unpleasant.  Her memory.was intact .The pt exhibited no overt signs of psychotic processes. There was no report of symptoms indicating derailment of thought  such as  A/V hallucinations.The pt denied S/H ideations and as a consequence  there was no immediate need  for a  safety plan.The patient's insight and judgement were within a range acceptable for safety.  Her fund of information appears to be within a normal range..    Rylee was extremely anxious to the point of being uncomfortable. The difficulty was that she had requested a  female therapist.She was encourage to pursue this, I agreed to hold place until a female therapist comes in May.  She would like to transfer to that therapist.    Rylee reports  a difficult upbringing.She reports that her mother was addicted to drug and alcohol., She was raised by an aunt as well as by others. She said that she lived in the \"[facility] and was at times homeless.She and brother came to [place] after her aunt's boyfriend bought a house for her family.    The pt reported that she seeking help for anxiety and depression because she can't tolerate the emotional storm that she is dealing with. She had seen a " male therapist but became uncomfortable with the therapeutic dialogue because  of  what she feels was inappropriate  references to her body        Progress Since Last Session (Related to Symptoms / Goals / Homework):   Symptoms:  new pt    Homework:  new pt     Current / Ongoing Stressors and Concerns:   Anxiety and Depression, highly anxious     Treatment Objective(s) Addressed in This Session:   Manage her anxiety     Intervention:                  Interview     Response to Interventions:   Agreeing to be held in place until a new therapist comes     ASSESSMENT: Current Emotional / Mental Status (status of significant symptoms):   Risk status (Self / Other harm or suicidal ideation)   Client denies current fears or concerns for personal safety.   Client denies current or recent suicidal ideation or behaviors.   Client denies current or recent homicidal ideation or behaviors.   Client denies current or recent self injurious behavior or ideation.   Client denies other safety concerns.   Recommended that patient call 911 or go to the local ED should there be a change in any of these risk factors     Appearance:   Appropriate    Eye Contact:   poor   Psychomotor Behavior: Restless    Attitude:   Guarded    Orientation:   All   Speech    Rate / Production: Somewhat emotional at firstm    Volume:  Soft    Mood:    Dysphoric,anxious,fearful   Affect:    Labile  Worrisome    Thought Content:  Unable to assess   Thought Form:  Blocking    Insight:    Fair         Medication Compliance:                  Feels that the psychiatric meds have not helpful     Changes in Health Issues:   None reported     Chemical Use Review:   Substance Use: Chemical use reviewed, no active concerns identified      Tobacco Use: No current tobacco use.       Collateral Reports Completed:   Not Applicable    PLAN: (Client Tasks / Therapist Tasks / Other)  Hold in place for a female therapist    Babak Olson [facility]

## 2025-04-16 NOTE — DISCHARGE INSTRUCTIONS
As discussed I want you to start the Pepcid tonight.  I also want you to start the omeprazole.  Please follow-up next week as scheduled with your doctor for further workup.  If symptoms worsen return to the ER.

## 2025-04-16 NOTE — ED PROVIDER NOTES
History     Chief Complaint   Patient presents with    Throat Problem     HPI  Rylee J Huttel is a 19 year old female who presents to the ER [facility] for evaluation of throat discomfort.  Patient has been had symptoms for the last month intermittently.  She has not been evaluated for this yet.  Patient denies any fevers or chills she has no cough congestion rhinorrhea she has no swelling.  She describes it as an intermittent sensation of having a hard time taking a deep breath.  She initially thought her symptoms were related to anxiety.  Denies any chest pain.    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 See scanned visit from [facility] - 2015.  CTSS services, therapy in school      Bilateral vesicoureteral reflux 2011     Priority: Medium     Formatting of this note might be different from the original. [facility] IMPRESSION: 1. Grade IV left vesicoureteral reflux. 2. Grade III right vesicoureteral reflux. 3. Moderate post void residual in the bladder. IMO Update 10/11      Urinary incontinence 2010     Priority: Medium     Formatting of this note might be different from the original. Urology visit 16 - behavioral modifications, habit toileting.  If no improvement in 2 months - mom is to contact urology and will consider oxybutynin.  Also with constipation - recommended miralax cleanout, then maintainence      Exotropia 2009     Priority: Medium     Formatting of this note might be different from the original. I[facility] Update      Astigmatism 2009     Priority: Medium     Formatting of this note might be different from the original. I[facility] Update       Myopia 04/28/2009     Priority: Medium        Past Medical History:    Past Medical History:   Diagnosis Date    Urinary incontinence 08/03/2010       Past Surgical History:    Past Surgical History:   Procedure Laterality Date    LAPAROSCOPIC CHOLECYSTECTOMY N/A 7/1/2024    Procedure: CHOLECYSTECTOMY, LAPAROSCOPIC;  Surgeon: Titi Arreguin MD;  Location: [facility] OR       Family History:    Family History   Problem Relation Age of Onset    Substance Abuse Mother     Diabetes Type 2  Maternal Grandmother     Peripheral Neuropathy Maternal Grandmother     Hypertension Maternal Grandfather     Heart Failure Maternal Grandfather        Social History:  Marital Status:  Single [1]  Social History     Tobacco Use    Smoking status: Former     Types: Cigarettes    Smokeless tobacco: Never   Vaping Use    Vaping status: Never Used   Substance Use Topics    Alcohol use: Not Currently    Drug use: Not Currently        Medications:    benzonatate (TESSALON) 100 MG capsule  FLUoxetine (PROZAC) 20 MG capsule  hydrOXYzine HCl (ATARAX) 25 MG tablet          Review of Systems   All other systems reviewed and are negative.      Physical Exam   BP: (!) 146/87  Pulse: 77  Temp: 98.5  F (36.9  C)  Resp: 18  Weight: 79.8 kg (175 lb 14.8 oz)  SpO2: 99 %      Physical Exam  Constitutional:       General: She is not in acute distress.     Appearance: Normal appearance. She is normal weight. She is not ill-appearing, toxic-appearing or diaphoretic.   HENT:      Head: Normocephalic and atraumatic.      Right Ear: External ear normal.      Left Ear: External ear normal.      Mouth/Throat:      Lips: Pink.      Mouth: Mucous membranes are moist.      Tongue: No lesions. Tongue does not deviate from midline.      Palate: No mass.      Pharynx: Oropharynx is clear. Uvula midline.      Tonsils: No tonsillar exudate or tonsillar abscesses.   Eyes:      Extraocular Movements: Extraocular movements intact.      Conjunctiva/sclera: Conjunctivae  normal.      Pupils: Pupils are equal, round, and reactive to light.   Neck:      Thyroid: No thyroid mass, thyromegaly or thyroid tenderness.   Cardiovascular:      Rate and Rhythm: Normal rate.   Pulmonary:      Effort: Pulmonary effort is normal. No respiratory distress.   Musculoskeletal:         General: Normal range of motion.      Cervical back: Full passive range of motion without pain, normal range of motion and neck supple. No edema.   Skin:     General: Skin is warm and dry.      Coloration: Skin is not jaundiced or pale.   Neurological:      Mental Status: She is alert and oriented to person, place, and time. Mental status is at baseline.      Cranial Nerves: No cranial nerve deficit.   Psychiatric:         Mood and Affect: Mood normal.         ED Course      No concerning findings are noted.  I discussed with her it is reasonable to start at this time with trying some acid reducers to evaluate for the possibility of this being related to acid reflux she has an appointment with her primary care doctor next week and can continue to further the workup.  Procedures                  No results found for this or any previous visit (from the past 24 hours).    Medications - No data to display    Assessments & Plan (with Medical Decision Making)     I have reviewed the nursing notes.    I have reviewed the findings, diagnosis, plan and need for follow up with the patient.          New Prescriptions    No medications on file       Final diagnoses:   Throat discomfort       4/15/2025   [facility]       Nicola Cote PA-C  04/15/25 2002

## 2025-04-17 ENCOUNTER — OFFICE VISIT (OUTPATIENT)
Dept: PSYCHOLOGY | Facility: OTHER | Age: 20
End: 2025-04-17
Attending: COUNSELOR
Payer: COMMERCIAL

## 2025-04-17 DIAGNOSIS — F32.A ANXIETY AND DEPRESSION: Primary | ICD-10-CM

## 2025-04-17 DIAGNOSIS — F41.9 ANXIETY AND DEPRESSION: Primary | ICD-10-CM

## 2025-04-17 PROCEDURE — 90834 PSYTX W PT 45 MINUTES: CPT | Performed by: COUNSELOR

## 2025-04-17 NOTE — PROGRESS NOTES
Progress Note    Client Name: Rylee J Huttel  Date: April 17, 2025         Service Type: Individual      Session Start Time: 1100  Session End Time: 1150      Session Length: 50         Attendees: Client attended alone      PHQ-9 :       7/9/2024     8:45 AM 11/22/2024     9:24 AM 3/26/2025     1:52 PM   PHQ-9 SCORE   PHQ-9 Total Score MyChart 4 (Minimal depression) 16 (Moderately severe depression)    PHQ-9 Total Score 4 16  15       Patient-reported      LYNETTE-7 :        7/9/2024     8:46 AM 11/22/2024     9:24 AM 3/24/2025    12:45 PM   LYNETTE-7 SCORE   Total Score 4 (minimal anxiety) 10 (moderate anxiety) 8 (mild anxiety)   Total Score 4 10  8        Patient-reported           DATA    Rylee J Huttel presented as O x 4,coherent,relevant and anxious.Her memory.was intact .The pt exhibited no overt signs of psychotic processes. There was no report of symptoms indicating derailment of thought  such as  A/V hallucinations.The pt denied S/H ideations and as a consequence  there was no immediate need  for a  safety plan.The patient's insight and judgement were within a range acceptable for safety.  Her  fund of information appears to be within a normal range. There  were no reports of sleep disturbance nor disturbance in appetite. .    The theme of this session was the pt's level of anxiety.She understands that she will see         Progress Since Last Session (Related to Symptoms / Goals / Homework):   Symptoms: No change      Homework:  none given     Current / Ongoing Stressors and Concerns:     Anxiety     Treatment Objective(s) Addressed in This Session:   Alleviate anxiety     Intervention:   CBT/Motivational interviewing         ASSESSMENT: Current Emotional / Mental Status (status of significant symptoms):   Risk status (Self / Other harm or suicidal ideation)   Client denies current fears or concerns for personal safety.   Client denies current or recent suicidal ideation  or behaviors.   Client denies current or recent homicidal ideation or behaviors.   Client denies current or recent self injurious behavior or ideation.   Client denies other safety concerns.   Recommended that patient call 911 or go to the local ED should there be a change in any of these risk factors     Appearance:   Appropriate    Eye Contact:   Good    Psychomotor Behavior: Normal    Attitude:   Cooperative    Orientation:   All   Speech    Rate / Production: Normal     Volume:  Normal    Mood:    Anxious and dysphoric   Affect:     Mood congruent   Thought Content:  Clear    Thought Form:  Coherent  Logical    Insight:    Fair         Medication Compliance:   Yes     Changes in Health Issues:   None reported     Chemical Use Review:   Substance Use: Chemical use reviewed, no active concerns identified      Tobacco Use: No current tobacco use.       Collateral Reports Completed:   Not Applicable    PLAN: (Client Tasks / Therapist Tasks / Other)    Hold in place for new therapist    JENAE Vargas

## 2025-04-30 ENCOUNTER — OFFICE VISIT (OUTPATIENT)
Dept: FAMILY MEDICINE | Facility: OTHER | Age: 20
End: 2025-04-30
Attending: STUDENT IN AN ORGANIZED HEALTH CARE EDUCATION/TRAINING PROGRAM
Payer: COMMERCIAL

## 2025-04-30 VITALS
BODY MASS INDEX: 27.99 KG/M2 | SYSTOLIC BLOOD PRESSURE: 124 MMHG | WEIGHT: 178.3 LBS | HEART RATE: 82 BPM | TEMPERATURE: 98.1 F | HEIGHT: 67 IN | OXYGEN SATURATION: 100 % | DIASTOLIC BLOOD PRESSURE: 70 MMHG

## 2025-04-30 DIAGNOSIS — F41.8 MIXED ANXIETY AND DEPRESSIVE DISORDER: Primary | ICD-10-CM

## 2025-04-30 DIAGNOSIS — R09.A2 GLOBUS SENSATION: ICD-10-CM

## 2025-04-30 PROCEDURE — G0463 HOSPITAL OUTPT CLINIC VISIT: HCPCS

## 2025-04-30 ASSESSMENT — ANXIETY QUESTIONNAIRES
7. FEELING AFRAID AS IF SOMETHING AWFUL MIGHT HAPPEN: SEVERAL DAYS
7. FEELING AFRAID AS IF SOMETHING AWFUL MIGHT HAPPEN: SEVERAL DAYS
2. NOT BEING ABLE TO STOP OR CONTROL WORRYING: SEVERAL DAYS
6. BECOMING EASILY ANNOYED OR IRRITABLE: NEARLY EVERY DAY
8. IF YOU CHECKED OFF ANY PROBLEMS, HOW DIFFICULT HAVE THESE MADE IT FOR YOU TO DO YOUR WORK, TAKE CARE OF THINGS AT HOME, OR GET ALONG WITH OTHER PEOPLE?: SOMEWHAT DIFFICULT
GAD7 TOTAL SCORE: 11
5. BEING SO RESTLESS THAT IT IS HARD TO SIT STILL: MORE THAN HALF THE DAYS
1. FEELING NERVOUS, ANXIOUS, OR ON EDGE: MORE THAN HALF THE DAYS
GAD7 TOTAL SCORE: 11
IF YOU CHECKED OFF ANY PROBLEMS ON THIS QUESTIONNAIRE, HOW DIFFICULT HAVE THESE PROBLEMS MADE IT FOR YOU TO DO YOUR WORK, TAKE CARE OF THINGS AT HOME, OR GET ALONG WITH OTHER PEOPLE: SOMEWHAT DIFFICULT
4. TROUBLE RELAXING: SEVERAL DAYS
GAD7 TOTAL SCORE: 11
3. WORRYING TOO MUCH ABOUT DIFFERENT THINGS: SEVERAL DAYS

## 2025-04-30 ASSESSMENT — ENCOUNTER SYMPTOMS: NERVOUS/ANXIOUS: 1

## 2025-04-30 ASSESSMENT — PAIN SCALES - GENERAL: PAINLEVEL_OUTOF10: NO PAIN (0)

## 2025-04-30 NOTE — PROGRESS NOTES
Assessment & Plan     Globus sensation  Possibly from GERD?  Her neck exam is normal with no palpable masses or nodules.  - TSH with free T4 reflex; Future    Mixed anxiety and depressive disorder  Clinically stable.  No panic attacks since last visit.    Has not started fluoxetine because she expressed concern about possible side effects.  Tells me she was told by her therapist to not start the prozac at this time  Denies active or passive SI/HI or AVH  She feels safe at home  She wishes to continue with therapy only.      Subjective Rylee is a 19 year old, presenting for the following health issues:  Anxiety        4/30/2025     3:06 PM   Additional Questions   Roomed by Jazmine PENN   Accompanied by self     Anxiety    History of Present Illness       Mental Health Follow-up:  Patient presents to follow-up on Anxiety.    Patient's anxiety since last visit has been:  Medium  The patient is having other symptoms associated with anxiety.  Any significant life events: health concerns  Patient is feeling anxious or having panic attacks.  Patient has no concerns about alcohol or drug use.    Reason for visit:  Anxietyand thyroid    She eats 0-1 servings of fruits and vegetables daily.She consumes 1 sweetened beverage(s) daily.She exercises with enough effort to increase her heart rate 10 to 19 minutes per day.  She exercises with enough effort to increase her heart rate 4 days per week. She is missing 7 dose(s) of medications per week.        Did not start Prozac.  Follows with Babak Olson.  Therapy is going well.  Voice gets hoarse, feels like she swallowed a rock and feels stuck in her throat.    She is not interested in starting medication at this time.  She tells me she is fine without the medication.    Has been dating online for the last 2 months.  Did not meet in person yet.    Anxiety has been good.  No panic attacks since I last saw her.   Not feeling depressed at all.  Not feeling down depressed or  hopeless or guilty  No Active or passive SI, no AVH    Anxiety   How are you doing with your anxiety since your last visit? No change  Are you having other symptoms that might be associated with anxiety? Yes:  throat issues, digestive issues.   Have you had a significant life event? No   Are you feeling depressed? No  Do you have any concerns with your use of alcohol or other drugs? No    Social History     Tobacco Use    Smoking status: Former     Types: Cigarettes    Smokeless tobacco: Never   Vaping Use    Vaping status: Never Used   Substance Use Topics    Alcohol use: Not Currently    Drug use: Not Currently         11/22/2024     9:24 AM 3/24/2025    12:45 PM 4/30/2025     2:43 PM   LYNETTE-7 SCORE   Total Score 10 (moderate anxiety) 8 (mild anxiety) 11 (moderate anxiety)   Total Score 10  8  11        Patient-reported         7/9/2024     8:45 AM 11/22/2024     9:24 AM 3/26/2025     1:52 PM   PHQ   PHQ-9 Total Score 4 16  15   Q9: Thoughts of better off dead/self-harm past 2 weeks Not at all Not at all Several days       Patient-reported         4/30/2025     2:43 PM   LYNETTE-7    1. Feeling nervous, anxious, or on edge 2   2. Not being able to stop or control worrying 1   3. Worrying too much about different things 1   4. Trouble relaxing 1   5. Being so restless that it is hard to sit still 2   6. Becoming easily annoyed or irritable 3   7. Feeling afraid, as if something awful might happen 1   LYNETTE-7 Total Score 11    If you checked any problems, how difficult have they made it for you to do your work, take care of things at home, or get along with other people? Somewhat difficult       Patient-reported          Review of Systems  Constitutional, HEENT, cardiovascular, pulmonary, GI, , musculoskeletal, neuro, skin, endocrine and psych systems are negative, except as otherwise noted.      Objective    /70 (BP Location: Right arm, Patient Position: Sitting, Cuff Size: Adult Regular)   Pulse 82   Temp 98.1  " F (36.7  C) (Tympanic)   Ht 1.702 m (5' 7\")   Wt 80.9 kg (178 lb 4.8 oz)   LMP  (LMP Unknown)   SpO2 100%   BMI 27.93 kg/m    Body mass index is 27.93 kg/m .  Physical Exam   GENERAL: alert and no distress  EYES: Eyes grossly normal to inspection, PERRL and conjunctivae and sclerae normal  HENT: ear canals and TM's normal, nose and mouth without ulcers or lesions  NECK: no adenopathy, no asymmetry, masses, or scars  RESP: lungs clear to auscultation - no rales, rhonchi or wheezes  CV: regular rate and rhythm, normal S1 S2, no S3 or S4, no murmur, click or rub, no peripheral edema  ABDOMEN: soft, nontender, no hepatosplenomegaly, no masses and bowel sounds normal  MS: no gross musculoskeletal defects noted, no edema  SKIN: no suspicious lesions or rashes  NEURO: Normal strength and tone, mentation intact and speech normal  PSYCH: mentation appears normal, affect normal/bright          Signed Electronically by: Odalis Davis MD    "

## 2025-05-30 ENCOUNTER — OFFICE VISIT (OUTPATIENT)
Dept: FAMILY MEDICINE | Facility: OTHER | Age: 20
End: 2025-05-30
Attending: STUDENT IN AN ORGANIZED HEALTH CARE EDUCATION/TRAINING PROGRAM
Payer: COMMERCIAL

## 2025-05-30 VITALS
TEMPERATURE: 98.8 F | DIASTOLIC BLOOD PRESSURE: 80 MMHG | HEART RATE: 84 BPM | OXYGEN SATURATION: 98 % | WEIGHT: 175 LBS | BODY MASS INDEX: 27.41 KG/M2 | SYSTOLIC BLOOD PRESSURE: 122 MMHG

## 2025-05-30 DIAGNOSIS — F43.10 PTSD (POST-TRAUMATIC STRESS DISORDER): ICD-10-CM

## 2025-05-30 DIAGNOSIS — F41.1 GENERALIZED ANXIETY DISORDER: ICD-10-CM

## 2025-05-30 DIAGNOSIS — R09.A2 GLOBUS SENSATION: ICD-10-CM

## 2025-05-30 DIAGNOSIS — F41.8 MIXED ANXIETY AND DEPRESSIVE DISORDER: ICD-10-CM

## 2025-05-30 DIAGNOSIS — K59.01 SLOW TRANSIT CONSTIPATION: ICD-10-CM

## 2025-05-30 DIAGNOSIS — R42 LIGHTHEADEDNESS: Primary | ICD-10-CM

## 2025-05-30 LAB
ANION GAP SERPL CALCULATED.3IONS-SCNC: 9 MMOL/L (ref 7–15)
BASOPHILS # BLD AUTO: 0 10E3/UL (ref 0–0.2)
BASOPHILS NFR BLD AUTO: 0 %
BUN SERPL-MCNC: 10.6 MG/DL (ref 6–20)
CALCIUM SERPL-MCNC: 9.2 MG/DL (ref 8.8–10.4)
CHLORIDE SERPL-SCNC: 105 MMOL/L (ref 98–107)
CREAT SERPL-MCNC: 0.82 MG/DL (ref 0.51–0.95)
EGFRCR SERPLBLD CKD-EPI 2021: >90 ML/MIN/1.73M2
EOSINOPHIL # BLD AUTO: 0.1 10E3/UL (ref 0–0.7)
EOSINOPHIL NFR BLD AUTO: 2 %
ERYTHROCYTE [DISTWIDTH] IN BLOOD BY AUTOMATED COUNT: 13.4 % (ref 10–15)
GLUCOSE SERPL-MCNC: 86 MG/DL (ref 70–99)
HCO3 SERPL-SCNC: 24 MMOL/L (ref 22–29)
HCT VFR BLD AUTO: 40.5 % (ref 35–47)
HGB BLD-MCNC: 13.6 G/DL (ref 11.7–15.7)
IMM GRANULOCYTES # BLD: 0 10E3/UL
IMM GRANULOCYTES NFR BLD: 0 %
LYMPHOCYTES # BLD AUTO: 1.8 10E3/UL (ref 0.8–5.3)
LYMPHOCYTES NFR BLD AUTO: 37 %
MCH RBC QN AUTO: 28.1 PG (ref 26.5–33)
MCHC RBC AUTO-ENTMCNC: 33.6 G/DL (ref 31.5–36.5)
MCV RBC AUTO: 84 FL (ref 78–100)
MONOCYTES # BLD AUTO: 0.4 10E3/UL (ref 0–1.3)
MONOCYTES NFR BLD AUTO: 7 %
NEUTROPHILS # BLD AUTO: 2.6 10E3/UL (ref 1.6–8.3)
NEUTROPHILS NFR BLD AUTO: 53 %
NRBC # BLD AUTO: 0 10E3/UL
NRBC BLD AUTO-RTO: 0 /100
PLATELET # BLD AUTO: 153 10E3/UL (ref 150–450)
POTASSIUM SERPL-SCNC: 3.9 MMOL/L (ref 3.4–5.3)
RBC # BLD AUTO: 4.84 10E6/UL (ref 3.8–5.2)
SODIUM SERPL-SCNC: 138 MMOL/L (ref 135–145)
TSH SERPL DL<=0.005 MIU/L-ACNC: 1.11 UIU/ML (ref 0.3–4.2)
WBC # BLD AUTO: 4.9 10E3/UL (ref 4–11)

## 2025-05-30 PROCEDURE — 84443 ASSAY THYROID STIM HORMONE: CPT | Mod: ZL | Performed by: STUDENT IN AN ORGANIZED HEALTH CARE EDUCATION/TRAINING PROGRAM

## 2025-05-30 PROCEDURE — 84132 ASSAY OF SERUM POTASSIUM: CPT | Mod: ZL | Performed by: STUDENT IN AN ORGANIZED HEALTH CARE EDUCATION/TRAINING PROGRAM

## 2025-05-30 PROCEDURE — 36415 COLL VENOUS BLD VENIPUNCTURE: CPT | Mod: ZL | Performed by: STUDENT IN AN ORGANIZED HEALTH CARE EDUCATION/TRAINING PROGRAM

## 2025-05-30 PROCEDURE — G0463 HOSPITAL OUTPT CLINIC VISIT: HCPCS

## 2025-05-30 PROCEDURE — 85025 COMPLETE CBC W/AUTO DIFF WBC: CPT | Mod: ZL | Performed by: STUDENT IN AN ORGANIZED HEALTH CARE EDUCATION/TRAINING PROGRAM

## 2025-05-30 ASSESSMENT — ENCOUNTER SYMPTOMS: CONSTIPATION: 1

## 2025-05-30 ASSESSMENT — PAIN SCALES - GENERAL: PAINLEVEL_OUTOF10: NO PAIN (0)

## 2025-05-30 NOTE — PROGRESS NOTES
"  Assessment & Plan     Lightheadedness  Will check labs to rule out anemia, electrolyte abnormalities   Dehydration? There is no laboratory evidence to suggest dehydration.  I do encourage her to push more fluids  - Basic metabolic panel; Future  - CBC with platelets and differential; Future  - CBC with platelets and differential  - Basic metabolic panel    Globus sensation  Feels like there is a foreign body stuck in her throat- doesn't recall any specific event that started this- not associated with any food she ingested.  No trouble swallowing liquids or solids.  No voice changes.  No fevers, chills, weight loss, or appetitie changes.  No shortness of breath  No palpable nodules on my exam.  Lungs CTAB  Family history of thyroid problems  She did have this checked in 2022, will repeat  No red flag s/s at this time, continue to monitor  She has untreated anxiety which believe is contributing  - TSH with free T4 reflex    Slow transit constipation  Chronic.  Encourage increasing fluid    Mixed anxiety and depressive disorder  Not controlled, she is not taking her medications as prescribed.      PTSD (post-traumatic stress disorder)  Encourage therapy    Generalized anxiety disorder  Not controlled.  She is not taking her medications, not compliant    BMI  Estimated body mass index is 27.41 kg/m  as calculated from the following:    Height as of 4/30/25: 1.702 m (5' 7\").    Weight as of this encounter: 79.4 kg (175 lb).         Subjective Rylee is a 19 year old, presenting for the following health issues:  Anemia, sugars, and Constipation      5/30/2025     2:56 PM   Additional Questions   Roomed by Shu BURNETT     Anemia    Constipation     History of Present Illness       Reason for visit:  Anemia constipation dm  Symptom onset:  More than a month  Symptoms include:  Lightheaded bruising  Symptom intensity:  Moderate  Symptom progression:  Staying the same  Had these symptoms before:  No  What makes it worse:  " No  What makes it better:  No   She is taking medications regularly.        Laxative is the only way she can use the bathroom  She has tried miralax and stool softeners and this is not helping.  Laxative pills from [facility]-       Constipation    Duration: lifetime  Description:       Frequency of bowel movements: 1 week       Consistency of stool: solid  Intensity:  severe  Accompanying signs and symptoms: abd pains       Abdominal pain: YES       Rectal pain: no        Blood in stool: no        Nausea/vomitting: YES  History:        Similar problems in past: YES  Precipitating or alleviating factors: laxatives, miralax, stool softeners       Medications worsening symptoms: no   Therapies tried and outcome: None       Chronic laxative use: YES- 1-2 laxatives once a week prn     Anemia     Duration: 1 year  Description (location/character/radiation): feeling lightheaded  Intensity:  moderate  Accompanying signs and symptoms: vision going and nausea  History (similar episodes/previous evaluation): None  Precipitating or alleviating factors: None  Therapies tried and outcome: None        Blood sugars, patient has family history for diabetes.       Review of Systems  Constitutional, HEENT, cardiovascular, pulmonary, GI, , musculoskeletal, neuro, skin, endocrine and psych systems are negative, except as otherwise noted.      Objective    /80 (BP Location: Left arm, Patient Position: Sitting, Cuff Size: Adult Regular)   Pulse 84   Temp 98.8  F (37.1  C) (Tympanic)   Wt 79.4 kg (175 lb)   LMP 04/28/2025 (Approximate)   SpO2 98%   BMI 27.41 kg/m    Body mass index is 27.41 kg/m .  Physical Exam   GENERAL: alert and no distress  EYES: Eyes grossly normal to inspection, PERRL and conjunctivae and sclerae normal  HENT: ear canals and TM's normal, nose and mouth without ulcers or lesions  NECK: no adenopathy, no asymmetry, masses, or scars  RESP: lungs clear to auscultation - no rales, rhonchi or wheezes  CV:  regular rate and rhythm, normal S1 S2, no S3 or S4, no murmur, click or rub, no peripheral edema  ABDOMEN: soft, nontender, no hepatosplenomegaly, no masses and bowel sounds normal  MS: no gross musculoskeletal defects noted, no edema  SKIN: no suspicious lesions or rashes  NEURO: Normal strength and tone, mentation intact and speech normal  PSYCH: mentation appears normal, affect normal/bright          Signed Electronically by: Odalis Davis MD

## 2025-05-30 NOTE — PATIENT INSTRUCTIONS
Alessandro digestion- keep refrigerated for maximum potency  Add psyllium to diet  Continue with increasing water intake and regular exercise.

## 2025-06-02 ENCOUNTER — RESULTS FOLLOW-UP (OUTPATIENT)
Dept: FAMILY MEDICINE | Facility: OTHER | Age: 20
End: 2025-06-02

## 2025-06-11 ENCOUNTER — HOSPITAL ENCOUNTER (EMERGENCY)
Facility: HOSPITAL | Age: 20
Discharge: HOME | End: 2025-06-11
Attending: FAMILY MEDICINE
Payer: COMMERCIAL

## 2025-06-11 ENCOUNTER — PATIENT OUTREACH (OUTPATIENT)
Dept: CARE COORDINATION | Facility: CLINIC | Age: 20
End: 2025-06-11

## 2025-06-11 VITALS
BODY MASS INDEX: 27.97 KG/M2 | TEMPERATURE: 98.3 F | OXYGEN SATURATION: 99 % | WEIGHT: 178.57 LBS | DIASTOLIC BLOOD PRESSURE: 75 MMHG | HEART RATE: 64 BPM | RESPIRATION RATE: 16 BRPM | SYSTOLIC BLOOD PRESSURE: 143 MMHG

## 2025-06-11 DIAGNOSIS — R35.0 URINARY FREQUENCY: ICD-10-CM

## 2025-06-11 DIAGNOSIS — M54.50 BILATERAL LOW BACK PAIN WITHOUT SCIATICA, UNSPECIFIED CHRONICITY: ICD-10-CM

## 2025-06-11 LAB
ALBUMIN UR-MCNC: NEGATIVE MG/DL
APPEARANCE UR: CLEAR
BILIRUB UR QL STRIP: NEGATIVE
COLOR UR AUTO: ABNORMAL
GLUCOSE UR STRIP-MCNC: NEGATIVE MG/DL
HCG UR QL: NEGATIVE
HGB UR QL STRIP: NEGATIVE
KETONES UR STRIP-MCNC: NEGATIVE MG/DL
LEUKOCYTE ESTERASE UR QL STRIP: ABNORMAL
MUCOUS THREADS #/AREA URNS LPF: PRESENT /LPF
NITRATE UR QL: NEGATIVE
PH UR STRIP: 6.5 [PH] (ref 4.7–8)
RBC URINE: 2 /HPF
SP GR UR STRIP: 1.02 (ref 1–1.03)
SQUAMOUS EPITHELIAL: 6 /HPF
UROBILINOGEN UR STRIP-MCNC: NORMAL MG/DL
WBC URINE: 4 /HPF

## 2025-06-11 PROCEDURE — 99283 EMERGENCY DEPT VISIT LOW MDM: CPT

## 2025-06-11 PROCEDURE — 81003 URINALYSIS AUTO W/O SCOPE: CPT | Performed by: NURSE PRACTITIONER

## 2025-06-11 PROCEDURE — 99283 EMERGENCY DEPT VISIT LOW MDM: CPT | Performed by: FAMILY MEDICINE

## 2025-06-11 PROCEDURE — 81025 URINE PREGNANCY TEST: CPT | Performed by: NURSE PRACTITIONER

## 2025-06-11 PROCEDURE — 87086 URINE CULTURE/COLONY COUNT: CPT | Performed by: NURSE PRACTITIONER

## 2025-06-11 ASSESSMENT — COLUMBIA-SUICIDE SEVERITY RATING SCALE - C-SSRS
2. HAVE YOU ACTUALLY HAD ANY THOUGHTS OF KILLING YOURSELF IN THE PAST MONTH?: NO
6. HAVE YOU EVER DONE ANYTHING, STARTED TO DO ANYTHING, OR PREPARED TO DO ANYTHING TO END YOUR LIFE?: NO
1. IN THE PAST MONTH, HAVE YOU WISHED YOU WERE DEAD OR WISHED YOU COULD GO TO SLEEP AND NOT WAKE UP?: NO

## 2025-06-11 ASSESSMENT — ACTIVITIES OF DAILY LIVING (ADL): ADLS_ACUITY_SCORE: 47

## 2025-06-11 NOTE — ED TRIAGE NOTES
[place] NP assessed patient in triage and determined patient not Urgent Care appropriate. Will be seen in Emergency Department.

## 2025-06-11 NOTE — ED PROVIDER NOTES
History     Chief Complaint   Patient presents with    Flank Pain     The patient is a 19 year old female who present to the ED in [place] with a problem list significant for adjustment disorder, bilateral vesicoureteral reflux, and previous urinary incontinence.  She denies having problems with the incontinence now.  She has vague bilateral flank pain that is constant.  It is mildly worse with movement.  She states that she has urinary frequency.  She has gone to the bathroom twice since she has been here.  She denies dysuria.    The history is provided by the patient and medical records.         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 See scanned visit from [facility] - 2015.  [facility]SS services, therapy in school      Bilateral vesicoureteral reflux 2011     Priority: Medium     Formatting of this note might be different from the original. [facility] IMPRESSION: 1. Grade IV left vesicoureteral reflux. 2. Grade III right vesicoureteral reflux. 3. Moderate post void residual in the bladder. IMO Update 10/11      Urinary incontinence 2010     Priority: Medium     Formatting of this note might be different from the original. Urology visit 16 - behavioral modifications, habit toileting.  If no improvement in 2 months - mom is to contact urology and will consider oxybutynin.  Also with constipation - recommended miralax cleanout, then maintainence      Exotropia 2009     Priority: Medium     Formatting of this note might be different from the original. IMO Update      Astigmatism 2009     Priority: Medium     Formatting of this note might be different  from the original. IMO Update      Myopia 2009     Priority: Medium        Past Medical History:    Past Medical History:   Diagnosis Date    Urinary incontinence 2010       Past Surgical History:    Past Surgical History:   Procedure Laterality Date    LAPAROSCOPIC CHOLECYSTECTOMY N/A 2024    Procedure: CHOLECYSTECTOMY, LAPAROSCOPIC;  Surgeon: Titi Arreguin MD;  Location: [facility] OR       Family History:    Family History   Problem Relation Age of Onset    Substance Abuse Mother     Diabetes Type 2  Maternal Grandmother     Peripheral Neuropathy Maternal Grandmother     Hypertension Maternal Grandfather     Heart Failure Maternal Grandfather        Social History:  Marital Status:  Single [1]  Social History     Tobacco Use    Smoking status: Former     Current packs/day: 0.00     Average packs/day: 0.3 packs/day for 3.0 years (0.8 ttl pk-yrs)     Types: Cigarettes     Start date:      Quit date:      Years since quittin.4    Smokeless tobacco: Never   Vaping Use    Vaping status: Never Used   Substance Use Topics    Alcohol use: Not Currently    Drug use: Not Currently        Medications:    hydrOXYzine HCl (ATARAX) 25 MG tablet          Review of Systems   Constitutional:  Negative for activity change, appetite change, chills, fatigue and fever.   HENT:  Negative for congestion, rhinorrhea and sore throat.    Eyes:  Negative for redness.   Respiratory:  Negative for cough and shortness of breath.    Cardiovascular:  Negative for chest pain.   Gastrointestinal:  Negative for abdominal pain, diarrhea, nausea and vomiting.   Genitourinary:  Positive for flank pain (bilateral) and frequency. Negative for dysuria and hematuria.   Musculoskeletal:  Negative for arthralgias, myalgias and neck stiffness.   Skin:  Negative for rash.   Neurological:  Negative for dizziness and headaches.       Physical Exam   BP: (!) 143/75  Pulse: 64  Temp: 98.3  F (36.8  C)  Resp: 16  Weight: 81 kg (178 lb  9.2 oz)  SpO2: 99 %      Physical Exam  Vitals and nursing note reviewed.   Constitutional:       Appearance: Normal appearance.   HENT:      Head: Normocephalic and atraumatic.   Eyes:      Conjunctiva/sclera: Conjunctivae normal.   Cardiovascular:      Rate and Rhythm: Normal rate and regular rhythm.      Pulses: Normal pulses.      Heart sounds: Normal heart sounds.   Pulmonary:      Effort: Pulmonary effort is normal.   Abdominal:      General: Abdomen is flat. Bowel sounds are normal.      Palpations: Abdomen is soft.      Tenderness: There is no abdominal tenderness. There is no right CVA tenderness, left CVA tenderness, guarding or rebound.      Hernia: No hernia is present.   Neurological:      Mental Status: She is alert.       ED Course     ED Course as of 06/11/25 1323   Wed Jun 11, 2025   1201 Labs ordered while patient in lobby.     Procedures              Results for orders placed or performed during the hospital encounter of 06/11/25 (from the past 24 hours)   UA with Microscopic reflex to Culture    Specimen: Urine, Midstream   Result Value Ref Range    Color Urine Light Yellow Colorless, Straw, Light Yellow, Yellow    Appearance Urine Clear Clear    Glucose Urine Negative Negative mg/dL    Bilirubin Urine Negative Negative    Ketones Urine Negative Negative mg/dL    Specific Gravity Urine 1.016 1.003 - 1.035    Blood Urine Negative Negative    pH Urine 6.5 4.7 - 8.0    Protein Albumin Urine Negative Negative mg/dL    Urobilinogen Urine Normal Normal mg/dL    Nitrite Urine Negative Negative    Leukocyte Esterase Urine Large (A) Negative    Mucus Urine Present (A) None Seen /LPF    RBC Urine 2 <=2 /HPF    WBC Urine 4 <=5 /HPF    Squamous Epithelials Urine 6 (H) <=1 /HPF    Narrative    Urine Culture ordered based on laboratory criteria   HCG qualitative urine (UPT)   Result Value Ref Range    hCG Urine Qualitative Negative Negative       Medications - No data to display    Assessments & Plan (with  Medical Decision Making)     I have reviewed the nursing notes.    I have reviewed the findings, diagnosis, plan and need for follow up with the patient.  The patient is a 19-year-old female who presents to the emergency department with bilateral flank pain that appears to be more consistent with mechanical back pain.  There is very low suspicion for possible ureteral stones.  There is low suspicion for a urinary tract infection given the lack of dysuria, however, the leukocyte esterase warrants doing the culture given her history.  We will call her with the results if therapy is indicated.  She does have a follow-up appoint with her primary medical provider scheduled.  Symptomatic therapies were reviewed.  Reasons to return to the emergency department discussed in detail.        Medical Decision Making    Differential diagnosis: Cystitis, bacterial vaginosis, candidiasis, trichomonas, chlamydia, Neisseria gonorrhea, HSV, urethritis, pelvic inflammatory disease, atrophic vaginitis, interstitial cystitis, urolithiasis, bladder neoplasm, foreign body, active bladder,        New Prescriptions    No medications on file       Final diagnoses:   Urinary frequency   Bilateral low back pain without sciatica, unspecified chronicity       6/11/2025   [facility]       David Garvey MD  06/12/25 [id number]

## 2025-06-12 LAB — BACTERIA UR CULT: NORMAL

## 2025-06-12 ASSESSMENT — ENCOUNTER SYMPTOMS
FREQUENCY: 1
VOMITING: 0
FLANK PAIN: 1
DIARRHEA: 0
CHILLS: 0
EYE REDNESS: 0
FATIGUE: 0
HEMATURIA: 0
DIZZINESS: 0
SORE THROAT: 0
APPETITE CHANGE: 0
SHORTNESS OF BREATH: 0
MYALGIAS: 0
NECK STIFFNESS: 0
RHINORRHEA: 0
DYSURIA: 0
ACTIVITY CHANGE: 0
COUGH: 0
HEADACHES: 0
FEVER: 0
NAUSEA: 0
ABDOMINAL PAIN: 0
ARTHRALGIAS: 0

## 2025-06-13 LAB — BACTERIA UR CULT: NORMAL

## 2025-07-09 ENCOUNTER — HOSPITAL ENCOUNTER (EMERGENCY)
Facility: HOSPITAL | Age: 20
Discharge: HOME | End: 2025-07-09
Attending: NURSE PRACTITIONER
Payer: COMMERCIAL

## 2025-07-09 VITALS
WEIGHT: 178.9 LBS | HEART RATE: 72 BPM | OXYGEN SATURATION: 100 % | BODY MASS INDEX: 28.02 KG/M2 | SYSTOLIC BLOOD PRESSURE: 112 MMHG | DIASTOLIC BLOOD PRESSURE: 62 MMHG | RESPIRATION RATE: 18 BRPM | TEMPERATURE: 98.7 F

## 2025-07-09 DIAGNOSIS — R10.9 LEFT FLANK PAIN: ICD-10-CM

## 2025-07-09 LAB
ALBUMIN SERPL BCG-MCNC: 3.9 G/DL (ref 3.5–5.2)
ALBUMIN UR-MCNC: NEGATIVE MG/DL
ALP SERPL-CCNC: 68 U/L (ref 40–150)
ALT SERPL W P-5'-P-CCNC: 31 U/L (ref 0–50)
ANION GAP SERPL CALCULATED.3IONS-SCNC: 9 MMOL/L (ref 7–15)
APPEARANCE UR: CLEAR
AST SERPL W P-5'-P-CCNC: 27 U/L (ref 0–45)
BILIRUB SERPL-MCNC: 0.2 MG/DL
BILIRUB UR QL STRIP: NEGATIVE
BUN SERPL-MCNC: 8.1 MG/DL (ref 6–20)
CALCIUM SERPL-MCNC: 9.1 MG/DL (ref 8.8–10.4)
CHLORIDE SERPL-SCNC: 102 MMOL/L (ref 98–107)
COLOR UR AUTO: ABNORMAL
CREAT SERPL-MCNC: 0.78 MG/DL (ref 0.51–0.95)
EGFRCR SERPLBLD CKD-EPI 2021: >90 ML/MIN/1.73M2
ERYTHROCYTE [DISTWIDTH] IN BLOOD BY AUTOMATED COUNT: 13.4 % (ref 10–15)
GLUCOSE SERPL-MCNC: 93 MG/DL (ref 70–99)
GLUCOSE UR STRIP-MCNC: NEGATIVE MG/DL
HCG UR QL: NEGATIVE
HCO3 SERPL-SCNC: 26 MMOL/L (ref 22–29)
HCT VFR BLD AUTO: 37.5 % (ref 35–47)
HGB BLD-MCNC: 12.4 G/DL (ref 11.7–15.7)
HGB UR QL STRIP: NEGATIVE
HOLD SPECIMEN: NORMAL
KETONES UR STRIP-MCNC: NEGATIVE MG/DL
LEUKOCYTE ESTERASE UR QL STRIP: NEGATIVE
LIPASE SERPL-CCNC: 15 U/L (ref 13–60)
MCH RBC QN AUTO: 29 PG (ref 26.5–33)
MCHC RBC AUTO-ENTMCNC: 33.1 G/DL (ref 31.5–36.5)
MCV RBC AUTO: 88 FL (ref 78–100)
MUCOUS THREADS #/AREA URNS LPF: PRESENT /LPF
NITRATE UR QL: NEGATIVE
PH UR STRIP: 5 [PH] (ref 4.7–8)
PLATELET # BLD AUTO: 147 10E3/UL (ref 150–450)
POTASSIUM SERPL-SCNC: 3.7 MMOL/L (ref 3.4–5.3)
PROT SERPL-MCNC: 6.4 G/DL (ref 6.4–8.3)
RBC # BLD AUTO: 4.27 10E6/UL (ref 3.8–5.2)
RBC URINE: 0 /HPF
SODIUM SERPL-SCNC: 137 MMOL/L (ref 135–145)
SP GR UR STRIP: 1.01 (ref 1–1.03)
SQUAMOUS EPITHELIAL: 2 /HPF
UROBILINOGEN UR STRIP-MCNC: NORMAL MG/DL
WBC # BLD AUTO: 5.5 10E3/UL (ref 4–11)
WBC URINE: <1 /HPF

## 2025-07-09 PROCEDURE — 99283 EMERGENCY DEPT VISIT LOW MDM: CPT | Performed by: NURSE PRACTITIONER

## 2025-07-09 PROCEDURE — 81001 URINALYSIS AUTO W/SCOPE: CPT | Performed by: NURSE PRACTITIONER

## 2025-07-09 PROCEDURE — 82310 ASSAY OF CALCIUM: CPT | Performed by: NURSE PRACTITIONER

## 2025-07-09 PROCEDURE — 99284 EMERGENCY DEPT VISIT MOD MDM: CPT | Performed by: NURSE PRACTITIONER

## 2025-07-09 PROCEDURE — 81025 URINE PREGNANCY TEST: CPT | Performed by: NURSE PRACTITIONER

## 2025-07-09 PROCEDURE — 83690 ASSAY OF LIPASE: CPT | Performed by: NURSE PRACTITIONER

## 2025-07-09 PROCEDURE — 36415 COLL VENOUS BLD VENIPUNCTURE: CPT | Performed by: NURSE PRACTITIONER

## 2025-07-09 PROCEDURE — 85014 HEMATOCRIT: CPT | Performed by: NURSE PRACTITIONER

## 2025-07-09 ASSESSMENT — ENCOUNTER SYMPTOMS
PSYCHIATRIC NEGATIVE: 1
HEMATOLOGIC/LYMPHATIC NEGATIVE: 1
ABDOMINAL PAIN: 0
VOMITING: 0
ENDOCRINE NEGATIVE: 1
FLANK PAIN: 1
CONSTIPATION: 0
BLOOD IN STOOL: 0
DYSURIA: 0
CONSTITUTIONAL NEGATIVE: 1
NAUSEA: 1
RESPIRATORY NEGATIVE: 1
ALLERGIC/IMMUNOLOGIC NEGATIVE: 1
DIARRHEA: 0
CARDIOVASCULAR NEGATIVE: 1
NEUROLOGICAL NEGATIVE: 1
EYES NEGATIVE: 1
HEMATURIA: 0

## 2025-07-09 ASSESSMENT — ACTIVITIES OF DAILY LIVING (ADL)
ADLS_ACUITY_SCORE: 43
ADLS_ACUITY_SCORE: 43

## 2025-07-09 NOTE — ED PROVIDER NOTES
History     Chief Complaint   Patient presents with    Flank Pain     HPI  Rylee J Huttel is a 20 year old individual with history of adjustment disorder, cystic ureteral reflux bilaterally, comes in for left flank pain.  Patient states has been having left flank pain for the past 2 weeks.  States has some urinary urgency/hesitancy.  With these symptoms patient comes in.  No fever or chills.  Has had nausea but no vomiting.  No diarrhea or constipation.  No melena or hematochezia.  No dysuria or hematuria reported.  No vaginal bleeding or discharge.  Last menses was 2 weeks ago.    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 See scanned visit from [facility] - 2015.  CTSS services, therapy in school      Bilateral vesicoureteral reflux 2011     Priority: Medium     Formatting of this note might be different from the original. [facility] IMPRESSION: 1. Grade IV left vesicoureteral reflux. 2. Grade III right vesicoureteral reflux. 3. Moderate post void residual in the bladder. IMO Update 10/11      Urinary incontinence 2010     Priority: Medium     Formatting of this note might be different from the original. Urology visit 16 - behavioral modifications, habit toileting.  If no improvement in 2 months - mom is to contact urology and will consider oxybutynin.  Also with constipation - recommended miralax cleanout, then maintainence      Exotropia 2009     Priority: Medium     Formatting of this note might be different from the original. IMO Update      Astigmatism 2009     Priority: Medium     Formatting of this note might be different from the  original. IMO Update      Myopia 2009     Priority: Medium        Past Medical History:    Past Medical History:   Diagnosis Date    Urinary incontinence 2010       Past Surgical History:    Past Surgical History:   Procedure Laterality Date    LAPAROSCOPIC CHOLECYSTECTOMY N/A 2024    Procedure: CHOLECYSTECTOMY, LAPAROSCOPIC;  Surgeon: Titi Arreguin MD;  Location: [facility]       Family History:    Family History   Problem Relation Age of Onset    Substance Abuse Mother     Diabetes Type 2  Maternal Grandmother     Peripheral Neuropathy Maternal Grandmother     Hypertension Maternal Grandfather     Heart Failure Maternal Grandfather        Social History:  Marital Status:  Single [1]  Social History     Tobacco Use    Smoking status: Former     Current packs/day: 0.00     Average packs/day: 0.3 packs/day for 3.0 years (0.8 ttl pk-yrs)     Types: Cigarettes     Start date:      Quit date:      Years since quittin.5    Smokeless tobacco: Never   Vaping Use    Vaping status: Never Used   Substance Use Topics    Alcohol use: Not Currently    Drug use: Not Currently        Medications:    hydrOXYzine HCl (ATARAX) 25 MG tablet          Review of Systems   Constitutional: Negative.    HENT: Negative.     Eyes: Negative.    Respiratory: Negative.     Cardiovascular: Negative.    Gastrointestinal:  Positive for nausea. Negative for abdominal pain, blood in stool, constipation, diarrhea and vomiting.   Endocrine: Negative.    Genitourinary:  Positive for flank pain and urgency. Negative for decreased urine volume, dysuria, hematuria, pelvic pain, vaginal bleeding and vaginal discharge.   Skin: Negative.    Allergic/Immunologic: Negative.    Neurological: Negative.    Hematological: Negative.    Psychiatric/Behavioral: Negative.         Physical Exam   BP: 131/86  Pulse: 77  Temp: 98.7  F (37.1  C)  Resp: 16  Weight: 81.1 kg (178 lb 14.4 oz)  SpO2: 99 %      GENERAL APPEARANCE:  The patient is  a 20 year old well-developed, well-nourished individual that appears as stated age.  LUNGS:  Breathing is easy.  Breath sounds are equal and clear bilaterally.  No wheezes, rhonchi, or rales.  HEART:  Regular rate and rhythm with normal S1 and S2.  No murmurs, gallops, or rubs.  ABDOMEN:  Soft.  Right lower quadrant and left upper quadrant abdominal tenderness to palpation.  No mass, guarding, or rebound.  No organomegaly or hernia.  Left CVA tenderness to palpation but no flank mass.  No abdominal bruits or thrills present upon auscultation/palpation.  GENITOURINARY: No obvious anterior pelvic tenderness, hernia, mass noted to palpation.  PSYCHIATRIC:  The patient is awake, alert, and oriented x4.  Recent and remote memory is intact.  Appropriate mood and affect.  Calm and cooperative with history and physical exam.  SKIN:  Warm, dry, and well perfused.  Good turgor.  No lesions, nodules, or rashes are noted.  No bruising noted.      ED Course     ED Course as of 07/09/25 1908 Wed Jul 09, 2025   1740 Labs ordered.   1753 In to see patient and history/physical completed.    1904 At this time patient in absolutely no distress.  Labs all normal.  No imaging warranted.  Let patient do acetaminophen/ibuprofen at home and follow-up with PCP.  Return precautions given.                 Recent Results (from the past 24 hours)   UA with Microscopic reflex to Culture    Specimen: Urine, Midstream   Result Value Ref Range    Color Urine Light Yellow Colorless, Straw, Light Yellow, Yellow    Appearance Urine Clear Clear    Glucose Urine Negative Negative mg/dL    Bilirubin Urine Negative Negative    Ketones Urine Negative Negative mg/dL    Specific Gravity Urine 1.014 1.003 - 1.035    Blood Urine Negative Negative    pH Urine 5.0 4.7 - 8.0    Protein Albumin Urine Negative Negative mg/dL    Urobilinogen Urine Normal Normal mg/dL    Nitrite Urine Negative Negative    Leukocyte Esterase Urine Negative Negative    Mucus Urine  Present (A) None Seen /LPF    RBC Urine 0 <=2 /HPF    WBC Urine <1 <=5 /HPF    Squamous Epithelials Urine 2 (H) <=1 /HPF    Narrative    Urine Culture not indicated   HCG qualitative urine (UPT)   Result Value Ref Range    hCG Urine Qualitative Negative Negative   CBC with platelets   Result Value Ref Range    WBC Count 5.5 4.0 - 11.0 10e3/uL    RBC Count 4.27 3.80 - 5.20 10e6/uL    Hemoglobin 12.4 11.7 - 15.7 g/dL    Hematocrit 37.5 35.0 - 47.0 %    MCV 88 78 - 100 fL    MCH 29.0 26.5 - 33.0 pg    MCHC 33.1 31.5 - 36.5 g/dL    RDW 13.4 10.0 - 15.0 %    Platelet Count 147 (L) 150 - 450 10e3/uL   Comprehensive metabolic panel   Result Value Ref Range    Sodium 137 135 - 145 mmol/L    Potassium 3.7 3.4 - 5.3 mmol/L    Carbon Dioxide (CO2) 26 22 - 29 mmol/L    Anion Gap 9 7 - 15 mmol/L    Urea Nitrogen 8.1 6.0 - 20.0 mg/dL    Creatinine 0.78 0.51 - 0.95 mg/dL    GFR Estimate >90 >60 mL/min/1.73m2    Calcium 9.1 8.8 - 10.4 mg/dL    Chloride 102 98 - 107 mmol/L    Glucose 93 70 - 99 mg/dL    Alkaline Phosphatase 68 40 - 150 U/L    AST 27 0 - 45 U/L    ALT 31 0 - 50 U/L    Protein Total 6.4 6.4 - 8.3 g/dL    Albumin 3.9 3.5 - 5.2 g/dL    Bilirubin Total 0.2 <=1.2 mg/dL   Lipase   Result Value Ref Range    Lipase 15 13 - 60 U/L   Extra Tube    Narrative    The following orders were created for panel order Extra Tube.  Procedure                               Abnormality         Status                     ---------                               -----------         ------                     Extra Blue Top Tube[[id number]]                             In process                 Extra Red Top Tube[[id number]]                              In process                 Extra Green Top (Lithiu...[[id number]]                      In process                   Please view results for these tests on the individual orders.       Medications - No data to display    Assessments & Plan (with Medical Decision Making)     I have reviewed the  nursing notes.    I have reviewed the findings, diagnosis, plan and need for follow up with the patient.    Summary:  Patient presents to the ER today for left flank pain.  Potential diagnosis which have been considered and evaluated include UTI, pyelonephritis, ureteral stone, pregnancy, diverticulitis, bowel obstruction, mesenteric ischemia, appendicitis, as well as others. Many of these have been excluded using the various modalities and assessment as noted on the chart. At the present time, the diagnosis is left flank pain.  Upon arrival, vitals signs are normal.  The patient is alert and oriented no distress.  Cardiac and respiratory examination normal.  Left CVA tenderness to palpation with no flank mass.  Left upper quadrant and right lower quadrant tenderness to palpation with no guarding or hernia.  No obvious anterior pelvic tenderness noted to palpation.  Lab work obtained showing WBC of 5.5 with hemoglobin of 12.4 and platelet count of 147.  Electrolytes , renal, hepatic functions normal with a lipase of 15.  Patient deferred any for pain on arrival.  Patient in absolutely no distress.  Labs are all normal.  For this reason imaging not warranted.  Will discharge home to do acetaminophen and ibuprofen for pain control.  Follow-up with PCP.  Return to ER if any worsening symptoms.  Patient verbalized understand agrees plan of care.  Patient discharged home.        Critical Care Time: None    Impression and plan discussed with patient. Questions answered, concerns addressed, indications for urgent re-evaluation reviewed, and  given. Patient/Parent/Caregiver agree with treatment plan and have no further questions at this time.  AVS provided at discharge.    This document was prepared using a combination of typing and voice generated software.  While every attempt was made for accuracy, spelling and grammatical errors may exist.              New Prescriptions    No medications on file       Final  diagnoses:   Left flank pain       7/9/2025   [facility]       Gilbert Live APRN CNP  07/09/25 1909

## 2025-07-10 NOTE — DISCHARGE INSTRUCTIONS
Pain control:   If your past medical conditions, allergies, current medications, or current status does not prevent you from using acetaminophen and/or ibuprofen, use the following:   Acetaminophen 650-1000 mg every 6 hours as needed for pain in addition to ibuprofen 400-600 mg every 6 hours as needed for pain.  Take these two medications together if wanted.    Remember that these are for AS NEEDED.  If not needed, do not take.           Follow-up with your primary care provider for reevaluation.  Contact your primary care provider if you have any questions or concerns.  Do not hesitate to return to the ER if any new or worsening symptoms.     Please read the attached instructions (if any).  They highlight more specific treatments and interventions for you at home.              Thank you for letting me participate in your care and wish you a fast and uneventful recovery,    Gilbert MADRIGAL CNP    Do not hesitate to contact me with questions or concerns.  [email or web address]

## 2025-07-24 ENCOUNTER — HOSPITAL ENCOUNTER (EMERGENCY)
Facility: HOSPITAL | Age: 20
Discharge: HOME | End: 2025-07-24
Attending: NURSE PRACTITIONER
Payer: COMMERCIAL

## 2025-07-24 VITALS
OXYGEN SATURATION: 99 % | SYSTOLIC BLOOD PRESSURE: 144 MMHG | TEMPERATURE: 98.5 F | DIASTOLIC BLOOD PRESSURE: 85 MMHG | HEART RATE: 97 BPM | BODY MASS INDEX: 29.73 KG/M2 | RESPIRATION RATE: 18 BRPM | WEIGHT: 189.8 LBS

## 2025-07-24 DIAGNOSIS — B96.89 BACTERIAL VAGINOSIS: ICD-10-CM

## 2025-07-24 DIAGNOSIS — B37.31 VAGINAL YEAST INFECTION: ICD-10-CM

## 2025-07-24 DIAGNOSIS — N76.0 BACTERIAL VAGINOSIS: ICD-10-CM

## 2025-07-24 DIAGNOSIS — Z11.3 SCREENING FOR STD (SEXUALLY TRANSMITTED DISEASE): Primary | ICD-10-CM

## 2025-07-24 DIAGNOSIS — R30.0 DYSURIA: ICD-10-CM

## 2025-07-24 LAB
ALBUMIN UR-MCNC: NEGATIVE MG/DL
APPEARANCE UR: CLEAR
BACTERIAL VAGINOSIS VAG-IMP: POSITIVE
BILIRUB UR QL STRIP: NEGATIVE
CANDIDA DNA VAG QL NAA+PROBE: DETECTED
CANDIDA GLABRATA / CANDIDA KRUSEI DNA: NOT DETECTED
COLOR UR AUTO: NORMAL
GLUCOSE UR STRIP-MCNC: NEGATIVE MG/DL
HCG UR QL: NEGATIVE
HGB UR QL STRIP: NEGATIVE
HOLD SPECIMEN: NORMAL
KETONES UR STRIP-MCNC: NEGATIVE MG/DL
LEUKOCYTE ESTERASE UR QL STRIP: NEGATIVE
NITRATE UR QL: NEGATIVE
PH UR STRIP: 5 [PH] (ref 4.7–8)
RBC URINE: <1 /HPF
SP GR UR STRIP: 1 (ref 1–1.03)
SQUAMOUS EPITHELIAL: 0 /HPF
T VAGINALIS DNA VAG QL NAA+PROBE: NOT DETECTED
UROBILINOGEN UR STRIP-MCNC: NORMAL MG/DL
WBC URINE: 1 /HPF

## 2025-07-24 PROCEDURE — 81025 URINE PREGNANCY TEST: CPT | Performed by: NURSE PRACTITIONER

## 2025-07-24 PROCEDURE — 81003 URINALYSIS AUTO W/O SCOPE: CPT | Performed by: NURSE PRACTITIONER

## 2025-07-24 PROCEDURE — G0463 HOSPITAL OUTPT CLINIC VISIT: HCPCS | Performed by: NURSE PRACTITIONER

## 2025-07-24 PROCEDURE — 87389 HIV-1 AG W/HIV-1&-2 AB AG IA: CPT | Performed by: NURSE PRACTITIONER

## 2025-07-24 PROCEDURE — 86803 HEPATITIS C AB TEST: CPT | Performed by: NURSE PRACTITIONER

## 2025-07-24 PROCEDURE — 81515 NFCT DS BV&VAGINITIS DNA ALG: CPT | Performed by: NURSE PRACTITIONER

## 2025-07-24 PROCEDURE — 36415 COLL VENOUS BLD VENIPUNCTURE: CPT | Performed by: NURSE PRACTITIONER

## 2025-07-24 PROCEDURE — 99213 OFFICE O/P EST LOW 20 MIN: CPT | Performed by: NURSE PRACTITIONER

## 2025-07-24 ASSESSMENT — ENCOUNTER SYMPTOMS
DYSURIA: 1
FEVER: 0
CHILLS: 0
HEMATURIA: 0
PSYCHIATRIC NEGATIVE: 1
FREQUENCY: 1

## 2025-07-24 ASSESSMENT — ACTIVITIES OF DAILY LIVING (ADL): ADLS_ACUITY_SCORE: 41

## 2025-07-25 LAB
HCV AB SERPL QL IA: NONREACTIVE
HIV 1+2 AB+HIV1 P24 AG SERPL QL IA: NONREACTIVE

## 2025-07-25 RX ORDER — FLUCONAZOLE 150 MG/1
TABLET ORAL
Qty: 2 TABLET | Refills: 0 | Status: SHIPPED | OUTPATIENT
Start: 2025-07-25 | End: 2025-07-28

## 2025-07-25 RX ORDER — METRONIDAZOLE 500 MG/1
500 TABLET ORAL 2 TIMES DAILY
Qty: 14 TABLET | Refills: 0 | Status: SHIPPED | OUTPATIENT
Start: 2025-07-25 | End: 2025-08-01

## 2025-07-25 NOTE — ED PROVIDER NOTES
History     Chief Complaint   Patient presents with    Exposure to STD     HPI  Rylee J Huttel is a 20 year old female who presents to urgent care today ambulatory with complaints of dysuria, frequency, vaginal discharge and vaginal odor.  Patient was seen on 2025 and had negative gonorrhea, negative chlamydia, negative for UTI and positive Candida.  Patient was treated with fluconazole.  Primary concern today is additional STD testing that she did not receive on 2025 such as HIV and hepatitis C, declines repeat gonorrhea and Chlamydia testing.  Patient does not have any genital sores, rashes, irritation.  Does not have any known exposure to an STD.  Unsure if she is currently pregnant, took Plan B a week ago.  No other concerns.    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 See scanned visit from [facility] - 2015.  CTSS services, therapy in school      Bilateral vesicoureteral reflux 2011     Priority: Medium     Formatting of this note might be different from the original. [facility] IMPRESSION: 1. Grade IV left vesicoureteral reflux. 2. Grade III right vesicoureteral reflux. 3. Moderate post void residual in the bladder. IMO Update 10/11      Urinary incontinence 2010     Priority: Medium     Formatting of this note might be different from the original. Urology visit 16 - behavioral modifications, habit toileting.  If no improvement in 2 months - mom is to contact urology and will consider oxybutynin.  Also with constipation - recommended miralax cleanout, then maintainence      Exotropia 2009     Priority: Medium     Formatting of this  note might be different from the original. IMO Update      Astigmatism 2009     Priority: Medium     Formatting of this note might be different from the original. IMO Update      Myopia 2009     Priority: Medium        Past Medical History:    Past Medical History:   Diagnosis Date    Urinary incontinence 2010       Past Surgical History:    Past Surgical History:   Procedure Laterality Date    LAPAROSCOPIC CHOLECYSTECTOMY N/A 2024    Procedure: CHOLECYSTECTOMY, LAPAROSCOPIC;  Surgeon: Titi Arreguin MD;  Location: [facility]       Family History:    Family History   Problem Relation Age of Onset    Substance Abuse Mother     Diabetes Type 2  Maternal Grandmother     Peripheral Neuropathy Maternal Grandmother     Hypertension Maternal Grandfather     Heart Failure Maternal Grandfather        Social History:  Marital Status:  Single [1]  Social History     Tobacco Use    Smoking status: Former     Current packs/day: 0.00     Average packs/day: 0.3 packs/day for 3.0 years (0.8 ttl pk-yrs)     Types: Cigarettes     Start date:      Quit date:      Years since quittin.5     Passive exposure: Past    Smokeless tobacco: Never   Vaping Use    Vaping status: Never Used   Substance Use Topics    Alcohol use: Not Currently    Drug use: Not Currently        Medications:    hydrOXYzine HCl (ATARAX) 25 MG tablet      Review of Systems   Constitutional:  Negative for chills and fever.   Genitourinary:  Positive for dysuria, frequency and vaginal discharge. Negative for genital sores, hematuria, pelvic pain, vaginal bleeding and vaginal pain.        Vaginal odor   Musculoskeletal:  Negative for gait problem.   Skin:  Negative for rash.   Psychiatric/Behavioral: Negative.       Physical Exam   BP: (!) 144/85  Pulse: 97  Temp: 98.5  F (36.9  C)  Resp: 18  Weight: 86.1 kg (189 lb 12.8 oz)  SpO2: 99 %    Physical Exam  Vitals and nursing note reviewed.   Constitutional:       General: She is not in  acute distress.     Appearance: Normal appearance. She is not ill-appearing or toxic-appearing.   Cardiovascular:      Rate and Rhythm: Normal rate and regular rhythm.      Pulses: Normal pulses.      Heart sounds: Normal heart sounds.   Pulmonary:      Effort: Pulmonary effort is normal.      Breath sounds: Normal breath sounds.   Abdominal:      Tenderness: There is no right CVA tenderness or left CVA tenderness.   Neurological:      Mental Status: She is alert.   Psychiatric:         Mood and Affect: Mood normal.       ED Course     Procedures    Recent Results (from the past 24 hours)   UA Macroscopic with reflex to Microscopic and Culture    Specimen: Urine, Clean Catch   Result Value Ref Range    Color Urine Straw Colorless, Straw, Light Yellow, Yellow    Appearance Urine Clear Clear    Glucose Urine Negative Negative mg/dL    Bilirubin Urine Negative Negative    Ketones Urine Negative Negative mg/dL    Specific Gravity Urine 1.004 1.003 - 1.035    Blood Urine Negative Negative    pH Urine 5.0 4.7 - 8.0    Protein Albumin Urine Negative Negative mg/dL    Urobilinogen Urine Normal Normal mg/dL    Nitrite Urine Negative Negative    Leukocyte Esterase Urine Negative Negative    RBC Urine <1 <=2 /HPF    WBC Urine 1 <=5 /HPF    Squamous Epithelials Urine 0 <=1 /HPF    Narrative    Microscopic not indicated  Urine Culture not indicated   HCG qualitative urine   Result Value Ref Range    hCG Urine Qualitative Negative Negative   Extra Tube    Narrative    The following orders were created for panel order Extra Tube.  Procedure                               Abnormality         Status                     ---------                               -----------         ------                     Extra Blue Top Tube[[id number]]                             In process                 Extra Red Top Tube[[id number]]                              In process                 Extra Green Top (Lithiu...[[id number]]                       In process                 Extra Green Top (Lithiu...[[id number]]                      In process                   Please view results for these tests on the individual orders.       Medications - No data to display    Assessments & Plan (with Medical Decision Making)     I have reviewed the nursing notes.    I have reviewed the findings, diagnosis, plan and need for follow up with the patient.  (Z11.3) Screening for STD (sexually transmitted disease)  (primary encounter diagnosis)  (R30.0) Dysuria  Plan:   Patient ambulatory with a nontoxic appearance.  Patient's primary concern was additional STD testing today, did receive gonorrhea and Chlamydia testing on 7/21/2025 which were negative, declines retesting.  Patient was positive for Candida at that time and was treated with fluconazole.  Unsure if currently pregnant, urine pregnancy test was negative during visit.  UA shows no urinary tract infection.  Vaginal panel in process.  HIV and hepatitis C testing in process.  Patient has no other concerns at this time.  Push fluids.  Will notify of testing once it finalizes.  Follow-up with primary care provider in 1 week if continued to have symptoms.  Follow-up with primary care provider or return to urgent care/ED with any worsening in condition or additional concerns.  Patient in agreement treatment plan.    Discharge Medication List as of 7/24/2025  7:41 PM        Final diagnoses:   Screening for STD (sexually transmitted disease)   Dysuria     7/24/2025   [facility]       Oumou Rodrigues NP  07/24/25 1947

## 2025-07-25 NOTE — DISCHARGE INSTRUCTIONS
Push fluids    We will notify you of STD testing and vaginal panel once it finalizes    Follow-up with primary care provider in 1 week if symptoms continue     Follow-up with primary care provider or return to urgent care/ED with any worsening in condition or additional concerns.

## 2025-07-25 NOTE — ED TRIAGE NOTES
States she is here to get STD testing. States she got tested for Chlamydia and gonorrhea 2 days ago but she wants to be tested for everything.  States that she is having thick white discharge and treating herself for yeast infection. She has frequency and pain with urination. She has an odor in her vagina.   She did take plan B a week ago.

## 2025-08-01 ENCOUNTER — APPOINTMENT (OUTPATIENT)
Dept: CT IMAGING | Facility: HOSPITAL | Age: 20
End: 2025-08-01
Attending: NURSE PRACTITIONER
Payer: COMMERCIAL

## 2025-08-01 ENCOUNTER — HOSPITAL ENCOUNTER (EMERGENCY)
Facility: HOSPITAL | Age: 20
Discharge: HOME | End: 2025-08-01
Attending: NURSE PRACTITIONER
Payer: COMMERCIAL

## 2025-08-01 VITALS
HEART RATE: 69 BPM | HEIGHT: 67 IN | WEIGHT: 172.95 LBS | OXYGEN SATURATION: 99 % | SYSTOLIC BLOOD PRESSURE: 129 MMHG | DIASTOLIC BLOOD PRESSURE: 68 MMHG | BODY MASS INDEX: 27.15 KG/M2 | TEMPERATURE: 98 F | RESPIRATION RATE: 16 BRPM

## 2025-08-01 DIAGNOSIS — R10.9 ABDOMINAL PAIN OF UNKNOWN ETIOLOGY: Primary | ICD-10-CM

## 2025-08-01 LAB
ALBUMIN SERPL BCG-MCNC: 4.5 G/DL (ref 3.5–5.2)
ALBUMIN UR-MCNC: NEGATIVE MG/DL
ALP SERPL-CCNC: 66 U/L (ref 40–150)
ALT SERPL W P-5'-P-CCNC: 19 U/L (ref 0–50)
ANION GAP SERPL CALCULATED.3IONS-SCNC: 11 MMOL/L (ref 7–15)
APPEARANCE UR: CLEAR
AST SERPL W P-5'-P-CCNC: 25 U/L (ref 0–45)
BACTERIA #/AREA URNS HPF: ABNORMAL /HPF
BASOPHILS # BLD AUTO: 0 10E3/UL (ref 0–0.2)
BASOPHILS NFR BLD AUTO: 1 %
BILIRUB DIRECT SERPL-MCNC: 0.16 MG/DL (ref 0–0.3)
BILIRUB SERPL-MCNC: 0.4 MG/DL
BILIRUB UR QL STRIP: NEGATIVE
BUN SERPL-MCNC: 8.8 MG/DL (ref 6–20)
CALCIUM SERPL-MCNC: 9.5 MG/DL (ref 8.8–10.4)
CHLORIDE SERPL-SCNC: 103 MMOL/L (ref 98–107)
COLOR UR AUTO: ABNORMAL
CREAT SERPL-MCNC: 0.83 MG/DL (ref 0.51–0.95)
EGFRCR SERPLBLD CKD-EPI 2021: >90 ML/MIN/1.73M2
EOSINOPHIL # BLD AUTO: 0.1 10E3/UL (ref 0–0.7)
EOSINOPHIL NFR BLD AUTO: 2 %
ERYTHROCYTE [DISTWIDTH] IN BLOOD BY AUTOMATED COUNT: 12.9 % (ref 10–15)
GLUCOSE SERPL-MCNC: 84 MG/DL (ref 70–99)
GLUCOSE UR STRIP-MCNC: NEGATIVE MG/DL
HCG UR QL: NEGATIVE
HCO3 SERPL-SCNC: 24 MMOL/L (ref 22–29)
HCT VFR BLD AUTO: 41.2 % (ref 35–47)
HGB BLD-MCNC: 14 G/DL (ref 11.7–15.7)
HGB UR QL STRIP: NEGATIVE
HOLD SPECIMEN: NORMAL
HOLD SPECIMEN: NORMAL
IMM GRANULOCYTES # BLD: 0 10E3/UL
IMM GRANULOCYTES NFR BLD: 0 %
KETONES UR STRIP-MCNC: NEGATIVE MG/DL
LEUKOCYTE ESTERASE UR QL STRIP: ABNORMAL
LIPASE SERPL-CCNC: 14 U/L (ref 13–60)
LYMPHOCYTES # BLD AUTO: 1.5 10E3/UL (ref 0.8–5.3)
LYMPHOCYTES NFR BLD AUTO: 33 %
MCH RBC QN AUTO: 29.4 PG (ref 26.5–33)
MCHC RBC AUTO-ENTMCNC: 34 G/DL (ref 31.5–36.5)
MCV RBC AUTO: 86 FL (ref 78–100)
MONOCYTES # BLD AUTO: 0.3 10E3/UL (ref 0–1.3)
MONOCYTES NFR BLD AUTO: 8 %
NEUTROPHILS # BLD AUTO: 2.5 10E3/UL (ref 1.6–8.3)
NEUTROPHILS NFR BLD AUTO: 57 %
NITRATE UR QL: NEGATIVE
NRBC # BLD AUTO: 0 10E3/UL
NRBC BLD AUTO-RTO: 0 /100
PH UR STRIP: 7.5 [PH] (ref 4.7–8)
PLATELET # BLD AUTO: 169 10E3/UL (ref 150–450)
POTASSIUM SERPL-SCNC: 3.8 MMOL/L (ref 3.4–5.3)
PROT SERPL-MCNC: 7.5 G/DL (ref 6.4–8.3)
RBC # BLD AUTO: 4.77 10E6/UL (ref 3.8–5.2)
RBC URINE: 0 /HPF
SODIUM SERPL-SCNC: 138 MMOL/L (ref 135–145)
SP GR UR STRIP: 1.01 (ref 1–1.03)
SQUAMOUS EPITHELIAL: 3 /HPF
UROBILINOGEN UR STRIP-MCNC: NORMAL MG/DL
WBC # BLD AUTO: 4.5 10E3/UL (ref 4–11)
WBC URINE: 1 /HPF

## 2025-08-01 PROCEDURE — 80048 BASIC METABOLIC PNL TOTAL CA: CPT | Performed by: NURSE PRACTITIONER

## 2025-08-01 PROCEDURE — 96374 THER/PROPH/DIAG INJ IV PUSH: CPT | Mod: XU

## 2025-08-01 PROCEDURE — 36415 COLL VENOUS BLD VENIPUNCTURE: CPT | Performed by: NURSE PRACTITIONER

## 2025-08-01 PROCEDURE — 81025 URINE PREGNANCY TEST: CPT | Performed by: NURSE PRACTITIONER

## 2025-08-01 PROCEDURE — 250N000011 HC RX IP 250 OP 636: Performed by: RADIOLOGY

## 2025-08-01 PROCEDURE — 85004 AUTOMATED DIFF WBC COUNT: CPT | Performed by: NURSE PRACTITIONER

## 2025-08-01 PROCEDURE — 74177 CT ABD & PELVIS W/CONTRAST: CPT

## 2025-08-01 PROCEDURE — 99285 EMERGENCY DEPT VISIT HI MDM: CPT | Mod: 25 | Performed by: NURSE PRACTITIONER

## 2025-08-01 PROCEDURE — 250N000011 HC RX IP 250 OP 636: Performed by: NURSE PRACTITIONER

## 2025-08-01 PROCEDURE — 82248 BILIRUBIN DIRECT: CPT | Performed by: NURSE PRACTITIONER

## 2025-08-01 PROCEDURE — 83690 ASSAY OF LIPASE: CPT | Performed by: NURSE PRACTITIONER

## 2025-08-01 PROCEDURE — 99284 EMERGENCY DEPT VISIT MOD MDM: CPT | Performed by: NURSE PRACTITIONER

## 2025-08-01 PROCEDURE — 74177 CT ABD & PELVIS W/CONTRAST: CPT | Mod: 26 | Performed by: RADIOLOGY

## 2025-08-01 PROCEDURE — 81003 URINALYSIS AUTO W/O SCOPE: CPT | Performed by: NURSE PRACTITIONER

## 2025-08-01 RX ORDER — IOPAMIDOL 755 MG/ML
84 INJECTION, SOLUTION INTRAVASCULAR ONCE
Status: COMPLETED | OUTPATIENT
Start: 2025-08-01 | End: 2025-08-01

## 2025-08-01 RX ADMIN — PROCHLORPERAZINE EDISYLATE 10 MG: 5 INJECTION INTRAMUSCULAR; INTRAVENOUS at 13:10

## 2025-08-01 RX ADMIN — IOPAMIDOL 84 ML: 755 INJECTION, SOLUTION INTRAVENOUS at 13:28

## 2025-08-01 ASSESSMENT — ENCOUNTER SYMPTOMS
RESPIRATORY NEGATIVE: 1
HEMATURIA: 0
CONSTITUTIONAL NEGATIVE: 1
DIARRHEA: 1
EYES NEGATIVE: 1
PSYCHIATRIC NEGATIVE: 1
HEMATOLOGIC/LYMPHATIC NEGATIVE: 1
ENDOCRINE NEGATIVE: 1
ABDOMINAL PAIN: 1
NAUSEA: 1
CONSTIPATION: 0
DYSURIA: 0
NEUROLOGICAL NEGATIVE: 1
VOMITING: 0
FLANK PAIN: 1
BLOOD IN STOOL: 0
CARDIOVASCULAR NEGATIVE: 1
ALLERGIC/IMMUNOLOGIC NEGATIVE: 1

## 2025-08-01 ASSESSMENT — ACTIVITIES OF DAILY LIVING (ADL)
ADLS_ACUITY_SCORE: 47

## 2025-08-01 ASSESSMENT — COLUMBIA-SUICIDE SEVERITY RATING SCALE - C-SSRS
1. IN THE PAST MONTH, HAVE YOU WISHED YOU WERE DEAD OR WISHED YOU COULD GO TO SLEEP AND NOT WAKE UP?: NO
6. HAVE YOU EVER DONE ANYTHING, STARTED TO DO ANYTHING, OR PREPARED TO DO ANYTHING TO END YOUR LIFE?: NO
2. HAVE YOU ACTUALLY HAD ANY THOUGHTS OF KILLING YOURSELF IN THE PAST MONTH?: NO

## (undated) DEVICE — ENDO TROCAR FIRST ENTRY KII FIOS ADV FIX 05X100MM CFF03

## (undated) DEVICE — SLEEVE SCD EXPRESS KNEE LENGTH MED 9529

## (undated) DEVICE — PACK LAPAROSCOPY CUSTOM SBACNLSMBF

## (undated) DEVICE — ESU GROUND PAD ADULT W/CORD E7507

## (undated) DEVICE — LABEL STERILE PREPRINTED FOR OR FRRH01-2M

## (undated) DEVICE — SU DERMABOND ADVANCED .7ML DNX12

## (undated) DEVICE — CANISTER SUCTION MEDI-VAC GUARDIAN 2000ML 90D 65651-220

## (undated) DEVICE — SUCTION STRYKERFLOW II 250-070-500

## (undated) DEVICE — ENDO DISSECTOR BLUNT 05MM  BTD05

## (undated) DEVICE — ELECTRODE CAUTERY LAP L-HOOK W/SHAFT 33CM 0020S

## (undated) DEVICE — DRESSING MEPILEX BORDER AG 3" X 3" (7.5CM X 7.5CM) 395290

## (undated) DEVICE — SOL WATER IRRIG 1000ML BOTTLE 2F7114

## (undated) DEVICE — SYSTEM CLEARIFY VISUALIZATION 21-345

## (undated) DEVICE — ENDO TROCAR SLEEVE KII ADV FIXATION 05X100MM CFS02

## (undated) DEVICE — EVAC SYSTEM CLEAR FLOW SC082500

## (undated) DEVICE — TUBE NASOGASTRIC 18FR 48" 2 LUMEN 8888266148

## (undated) DEVICE — ESU CORD MONOPOLAR 10'  E0510

## (undated) DEVICE — SUCTION MANIFOLD NEPTUNE 2 SYS 1 PORT 702-025-000

## (undated) DEVICE — GLOVE 8.5 PROTEXIS PI CLSC PF BD CUF STRL LF 12IN 2D72PL85X

## (undated) DEVICE — SOL NACL 0.9% INJ 1000ML BAG 2B1324X

## (undated) DEVICE — SU MONOCRYL 4-0 PS-2 18" UND Y496G

## (undated) DEVICE — PACK BASIN SET UP SUTCNBSBBA

## (undated) DEVICE — ENDO POUCH UNIV RETRIEVAL SYSTEM INZII 10MM CD001

## (undated) DEVICE — SU VICRYL 0 UR-6 27" J603H

## (undated) DEVICE — TUBING INSUFFLATION INSUFFLATOR FILTER HIGH FLOW 031322-01

## (undated) DEVICE — STPL SKIN 35W 6.9MM  PXW35

## (undated) DEVICE — BLANKET BAIR HUGGER UPPER BODY 42268

## (undated) DEVICE — CLIP APPLIER ENDO 5MM M/L LIGAMAX EL5ML

## (undated) DEVICE — COVER LT HANDLE 2/PK 5160-2FG

## (undated) DEVICE — ENDO TROCAR BLUNT TIP KII BALLOON 12X100MM C0R47

## (undated) DEVICE — DRSG STERI STRIP 1/2X4" R1547

## (undated) DEVICE — ESU ENDO SCISSORS 5MM CVD 5DCS

## (undated) DEVICE — PREP CHLORAPREP 26ML TINTED HI-LITE ORANGE 930815

## (undated) DEVICE — GOWN SURG XL LVL 3 REINFORCED 9541

## (undated) DEVICE — SOL NACL 0.9% IRRIG 1000ML BOTTLE 2F7124

## (undated) RX ORDER — PROPOFOL 10 MG/ML
INJECTION, EMULSION INTRAVENOUS
Status: DISPENSED
Start: 2024-07-01

## (undated) RX ORDER — ONDANSETRON 2 MG/ML
INJECTION INTRAMUSCULAR; INTRAVENOUS
Status: DISPENSED
Start: 2024-07-01

## (undated) RX ORDER — FENTANYL CITRATE 50 UG/ML
INJECTION, SOLUTION INTRAMUSCULAR; INTRAVENOUS
Status: DISPENSED
Start: 2024-07-01

## (undated) RX ORDER — DEXAMETHASONE SODIUM PHOSPHATE 10 MG/ML
INJECTION, SOLUTION INTRAMUSCULAR; INTRAVENOUS
Status: DISPENSED
Start: 2024-07-01